# Patient Record
Sex: MALE | Race: WHITE | NOT HISPANIC OR LATINO | Employment: FULL TIME | URBAN - METROPOLITAN AREA
[De-identification: names, ages, dates, MRNs, and addresses within clinical notes are randomized per-mention and may not be internally consistent; named-entity substitution may affect disease eponyms.]

---

## 2018-02-13 ENCOUNTER — OFFICE VISIT - HEALTHEAST (OUTPATIENT)
Dept: FAMILY MEDICINE | Facility: CLINIC | Age: 38
End: 2018-02-13

## 2018-02-13 DIAGNOSIS — J02.9 SORE THROAT: ICD-10-CM

## 2018-02-13 LAB — DEPRECATED S PYO AG THROAT QL EIA: NORMAL

## 2018-02-13 ASSESSMENT — MIFFLIN-ST. JEOR: SCORE: 1681.04

## 2018-02-14 LAB — GROUP A STREP BY PCR: NORMAL

## 2018-02-16 ENCOUNTER — OFFICE VISIT - HEALTHEAST (OUTPATIENT)
Dept: FAMILY MEDICINE | Facility: CLINIC | Age: 38
End: 2018-02-16

## 2018-02-16 DIAGNOSIS — R05.9 COUGH: ICD-10-CM

## 2018-02-16 DIAGNOSIS — J02.9 SORE THROAT: ICD-10-CM

## 2018-02-16 DIAGNOSIS — R59.1 LYMPHADENOPATHY: ICD-10-CM

## 2018-02-16 LAB
DEPRECATED S PYO AG THROAT QL EIA: NORMAL
FLUAV AG SPEC QL IA: NORMAL
FLUBV AG SPEC QL IA: NORMAL
MONOCYTES NFR BLD AUTO: NEGATIVE %

## 2018-02-16 ASSESSMENT — MIFFLIN-ST. JEOR: SCORE: 1681.04

## 2018-02-17 LAB — GROUP A STREP BY PCR: NORMAL

## 2018-08-13 ENCOUNTER — COMMUNICATION - HEALTHEAST (OUTPATIENT)
Dept: TELEHEALTH | Facility: CLINIC | Age: 38
End: 2018-08-13

## 2018-08-13 ENCOUNTER — OFFICE VISIT - HEALTHEAST (OUTPATIENT)
Dept: FAMILY MEDICINE | Facility: CLINIC | Age: 38
End: 2018-08-13

## 2018-08-13 DIAGNOSIS — K64.9 HEMORRHOIDS, UNSPECIFIED HEMORRHOID TYPE: ICD-10-CM

## 2018-08-13 DIAGNOSIS — K62.5 RECTAL BLEEDING: ICD-10-CM

## 2018-08-13 DIAGNOSIS — R10.32 INGUINAL PAIN OF BOTH SIDES: ICD-10-CM

## 2018-08-13 DIAGNOSIS — R50.9 FEVER, UNSPECIFIED FEVER CAUSE: ICD-10-CM

## 2018-08-13 DIAGNOSIS — R35.89 POLYURIA: ICD-10-CM

## 2018-08-13 DIAGNOSIS — R10.31 INGUINAL PAIN OF BOTH SIDES: ICD-10-CM

## 2018-08-13 DIAGNOSIS — R10.32 LLQ ABDOMINAL PAIN: ICD-10-CM

## 2018-08-13 LAB
ALBUMIN SERPL-MCNC: 4.1 G/DL (ref 3.5–5)
ALBUMIN UR-MCNC: NEGATIVE MG/DL
ALP SERPL-CCNC: 50 U/L (ref 45–120)
ALT SERPL W P-5'-P-CCNC: 18 U/L (ref 0–45)
ANION GAP SERPL CALCULATED.3IONS-SCNC: 12 MMOL/L (ref 5–18)
APPEARANCE UR: ABNORMAL
AST SERPL W P-5'-P-CCNC: 14 U/L (ref 0–40)
BASOPHILS # BLD AUTO: 0 THOU/UL (ref 0–0.2)
BASOPHILS NFR BLD AUTO: 1 % (ref 0–2)
BILIRUB SERPL-MCNC: 0.5 MG/DL (ref 0–1)
BILIRUB UR QL STRIP: NEGATIVE
BUN SERPL-MCNC: 13 MG/DL (ref 8–22)
CALCIUM SERPL-MCNC: 9.3 MG/DL (ref 8.5–10.5)
CHLORIDE BLD-SCNC: 105 MMOL/L (ref 98–107)
CO2 SERPL-SCNC: 24 MMOL/L (ref 22–31)
COLOR UR AUTO: YELLOW
CREAT SERPL-MCNC: 1.08 MG/DL (ref 0.7–1.3)
EOSINOPHIL # BLD AUTO: 0.3 THOU/UL (ref 0–0.4)
EOSINOPHIL NFR BLD AUTO: 5 % (ref 0–6)
ERYTHROCYTE [DISTWIDTH] IN BLOOD BY AUTOMATED COUNT: 12.2 % (ref 11–14.5)
GFR SERPL CREATININE-BSD FRML MDRD: >60 ML/MIN/1.73M2
GLUCOSE BLD-MCNC: 98 MG/DL (ref 70–125)
GLUCOSE UR STRIP-MCNC: NEGATIVE MG/DL
HCT VFR BLD AUTO: 42.4 % (ref 40–54)
HGB BLD-MCNC: 14.9 G/DL (ref 14–18)
HGB UR QL STRIP: NEGATIVE
KETONES UR STRIP-MCNC: NEGATIVE MG/DL
LEUKOCYTE ESTERASE UR QL STRIP: NEGATIVE
LYMPHOCYTES # BLD AUTO: 1.9 THOU/UL (ref 0.8–4.4)
LYMPHOCYTES NFR BLD AUTO: 34 % (ref 20–40)
MCH RBC QN AUTO: 30.4 PG (ref 27–34)
MCHC RBC AUTO-ENTMCNC: 35 G/DL (ref 32–36)
MCV RBC AUTO: 87 FL (ref 80–100)
MONOCYTES # BLD AUTO: 0.4 THOU/UL (ref 0–0.9)
MONOCYTES NFR BLD AUTO: 8 % (ref 2–10)
NEUTROPHILS # BLD AUTO: 3 THOU/UL (ref 2–7.7)
NEUTROPHILS NFR BLD AUTO: 53 % (ref 50–70)
NITRATE UR QL: NEGATIVE
PH UR STRIP: 7.5 [PH] (ref 5–8)
PLATELET # BLD AUTO: 226 THOU/UL (ref 140–440)
PMV BLD AUTO: 8.8 FL (ref 7–10)
POTASSIUM BLD-SCNC: 4.3 MMOL/L (ref 3.5–5)
PROT SERPL-MCNC: 7.1 G/DL (ref 6–8)
RBC # BLD AUTO: 4.89 MILL/UL (ref 4.4–6.2)
SODIUM SERPL-SCNC: 141 MMOL/L (ref 136–145)
SP GR UR STRIP: 1.02 (ref 1–1.03)
UROBILINOGEN UR STRIP-ACNC: ABNORMAL
WBC: 5.5 THOU/UL (ref 4–11)

## 2018-08-13 ASSESSMENT — MIFFLIN-ST. JEOR: SCORE: 1693.73

## 2018-08-14 ENCOUNTER — COMMUNICATION - HEALTHEAST (OUTPATIENT)
Dept: FAMILY MEDICINE | Facility: CLINIC | Age: 38
End: 2018-08-14

## 2018-08-15 ENCOUNTER — HOSPITAL ENCOUNTER (OUTPATIENT)
Dept: ULTRASOUND IMAGING | Facility: HOSPITAL | Age: 38
Discharge: HOME | End: 2018-08-15
Attending: NURSE PRACTITIONER

## 2018-08-15 DIAGNOSIS — R10.30 LOWER ABDOMINAL PAIN, UNSPECIFIED: ICD-10-CM

## 2018-08-15 DIAGNOSIS — R10.31 INGUINAL PAIN OF BOTH SIDES: ICD-10-CM

## 2018-08-15 DIAGNOSIS — R10.32 INGUINAL PAIN OF BOTH SIDES: ICD-10-CM

## 2018-08-17 ENCOUNTER — COMMUNICATION - HEALTHEAST (OUTPATIENT)
Dept: FAMILY MEDICINE | Facility: CLINIC | Age: 38
End: 2018-08-17

## 2018-09-28 ENCOUNTER — RECORDS - HEALTHEAST (OUTPATIENT)
Dept: ADMINISTRATIVE | Facility: OTHER | Age: 38
End: 2018-09-28

## 2018-10-12 ENCOUNTER — RECORDS - HEALTHEAST (OUTPATIENT)
Dept: ADMINISTRATIVE | Facility: OTHER | Age: 38
End: 2018-10-12

## 2018-11-12 ENCOUNTER — RECORDS - HEALTHEAST (OUTPATIENT)
Dept: ADMINISTRATIVE | Facility: OTHER | Age: 38
End: 2018-11-12

## 2019-01-03 ENCOUNTER — OFFICE VISIT - HEALTHEAST (OUTPATIENT)
Dept: FAMILY MEDICINE | Facility: CLINIC | Age: 39
End: 2019-01-03

## 2019-01-03 DIAGNOSIS — K14.1 GEOGRAPHIC TONGUE: ICD-10-CM

## 2019-01-03 DIAGNOSIS — J01.90 ACUTE SINUSITIS, RECURRENCE NOT SPECIFIED, UNSPECIFIED LOCATION: ICD-10-CM

## 2019-01-03 DIAGNOSIS — J02.9 SORE THROAT: ICD-10-CM

## 2019-01-03 LAB — DEPRECATED S PYO AG THROAT QL EIA: NORMAL

## 2019-01-04 LAB — GROUP A STREP BY PCR: NORMAL

## 2019-08-02 ENCOUNTER — OFFICE VISIT - HEALTHEAST (OUTPATIENT)
Dept: FAMILY MEDICINE | Facility: CLINIC | Age: 39
End: 2019-08-02

## 2019-08-02 ENCOUNTER — RECORDS - HEALTHEAST (OUTPATIENT)
Dept: ADMINISTRATIVE | Facility: OTHER | Age: 39
End: 2019-08-02

## 2019-08-02 DIAGNOSIS — L03.115 CELLULITIS OF RIGHT LOWER EXTREMITY: ICD-10-CM

## 2019-08-02 LAB — WBC: 8.6 THOU/UL (ref 4–11)

## 2019-08-02 ASSESSMENT — MIFFLIN-ST. JEOR: SCORE: 1691

## 2019-08-08 ENCOUNTER — COMMUNICATION - HEALTHEAST (OUTPATIENT)
Dept: CARE COORDINATION | Facility: CLINIC | Age: 39
End: 2019-08-08

## 2019-08-15 ENCOUNTER — OFFICE VISIT - HEALTHEAST (OUTPATIENT)
Dept: FAMILY MEDICINE | Facility: CLINIC | Age: 39
End: 2019-08-15

## 2019-08-15 DIAGNOSIS — L03.115 CELLULITIS OF RIGHT LOWER EXTREMITY: ICD-10-CM

## 2019-08-15 DIAGNOSIS — L02.415 ABSCESS OF RIGHT LOWER EXTREMITY: ICD-10-CM

## 2019-08-15 ASSESSMENT — MIFFLIN-ST. JEOR: SCORE: 1683.29

## 2019-08-23 ENCOUNTER — COMMUNICATION - HEALTHEAST (OUTPATIENT)
Dept: LAB | Facility: CLINIC | Age: 39
End: 2019-08-23

## 2019-08-23 ENCOUNTER — AMBULATORY - HEALTHEAST (OUTPATIENT)
Dept: FAMILY MEDICINE | Facility: CLINIC | Age: 39
End: 2019-08-23

## 2019-08-23 ENCOUNTER — AMBULATORY - HEALTHEAST (OUTPATIENT)
Dept: LAB | Facility: CLINIC | Age: 39
End: 2019-08-23

## 2019-08-23 DIAGNOSIS — L03.115 CELLULITIS OF RIGHT LOWER EXTREMITY: ICD-10-CM

## 2019-08-28 LAB
AEROBIC BLOOD CULTURE BOTTLE: NO GROWTH
ANAEROBIC BLOOD CULTURE BOTTLE: NO GROWTH

## 2019-10-21 ENCOUNTER — OFFICE VISIT - HEALTHEAST (OUTPATIENT)
Dept: FAMILY MEDICINE | Facility: CLINIC | Age: 39
End: 2019-10-21

## 2019-10-21 DIAGNOSIS — H69.93 DYSFUNCTION OF BOTH EUSTACHIAN TUBES: ICD-10-CM

## 2019-10-21 DIAGNOSIS — J01.90 ACUTE SINUSITIS WITH SYMPTOMS > 10 DAYS: ICD-10-CM

## 2019-10-21 DIAGNOSIS — H93.13 TINNITUS, BILATERAL: ICD-10-CM

## 2019-12-02 ENCOUNTER — RECORDS - HEALTHEAST (OUTPATIENT)
Dept: ADMINISTRATIVE | Facility: OTHER | Age: 39
End: 2019-12-02

## 2019-12-12 ENCOUNTER — RECORDS - HEALTHEAST (OUTPATIENT)
Dept: ADMINISTRATIVE | Facility: OTHER | Age: 39
End: 2019-12-12

## 2019-12-17 ENCOUNTER — OFFICE VISIT - HEALTHEAST (OUTPATIENT)
Dept: FAMILY MEDICINE | Facility: CLINIC | Age: 39
End: 2019-12-17

## 2019-12-17 DIAGNOSIS — J30.9 ALLERGIC RHINITIS, UNSPECIFIED SEASONALITY, UNSPECIFIED TRIGGER: ICD-10-CM

## 2019-12-17 DIAGNOSIS — Z01.818 ENCOUNTER FOR PREOPERATIVE EXAMINATION FOR GENERAL SURGICAL PROCEDURE: ICD-10-CM

## 2019-12-17 DIAGNOSIS — J34.2 DEVIATED NASAL SEPTUM: ICD-10-CM

## 2019-12-17 LAB — HGB BLD-MCNC: 15.4 G/DL (ref 14–18)

## 2019-12-17 ASSESSMENT — MIFFLIN-ST. JEOR: SCORE: 1688.28

## 2019-12-26 ENCOUNTER — RECORDS - HEALTHEAST (OUTPATIENT)
Dept: ADMINISTRATIVE | Facility: OTHER | Age: 39
End: 2019-12-26

## 2020-01-02 ENCOUNTER — RECORDS - HEALTHEAST (OUTPATIENT)
Dept: ADMINISTRATIVE | Facility: OTHER | Age: 40
End: 2020-01-02

## 2020-01-14 ENCOUNTER — RECORDS - HEALTHEAST (OUTPATIENT)
Dept: ADMINISTRATIVE | Facility: OTHER | Age: 40
End: 2020-01-14

## 2020-01-30 ENCOUNTER — RECORDS - HEALTHEAST (OUTPATIENT)
Dept: ADMINISTRATIVE | Facility: OTHER | Age: 40
End: 2020-01-30

## 2020-02-11 ENCOUNTER — RECORDS - HEALTHEAST (OUTPATIENT)
Dept: ADMINISTRATIVE | Facility: OTHER | Age: 40
End: 2020-02-11

## 2020-10-27 ENCOUNTER — OFFICE VISIT - HEALTHEAST (OUTPATIENT)
Dept: FAMILY MEDICINE | Facility: CLINIC | Age: 40
End: 2020-10-27

## 2020-10-27 DIAGNOSIS — M54.2 NECK PAIN: ICD-10-CM

## 2020-10-27 DIAGNOSIS — M54.50 LUMBAR PAIN: ICD-10-CM

## 2020-10-27 DIAGNOSIS — Z13.220 LIPID SCREENING: ICD-10-CM

## 2020-10-27 DIAGNOSIS — Z13.1 DIABETES MELLITUS SCREENING: ICD-10-CM

## 2020-10-27 DIAGNOSIS — Z00.00 ROUTINE GENERAL MEDICAL EXAMINATION AT A HEALTH CARE FACILITY: ICD-10-CM

## 2020-10-27 DIAGNOSIS — J30.9 ALLERGIC RHINITIS, UNSPECIFIED SEASONALITY, UNSPECIFIED TRIGGER: ICD-10-CM

## 2020-10-27 LAB
CHOLEST SERPL-MCNC: 252 MG/DL
FASTING STATUS PATIENT QL REPORTED: YES
FASTING STATUS PATIENT QL REPORTED: YES
GLUCOSE BLD-MCNC: 86 MG/DL (ref 70–125)
HDLC SERPL-MCNC: 59 MG/DL
LDLC SERPL CALC-MCNC: 169 MG/DL
TRIGL SERPL-MCNC: 121 MG/DL

## 2020-10-27 ASSESSMENT — MIFFLIN-ST. JEOR: SCORE: 1682.18

## 2020-12-02 ENCOUNTER — COMMUNICATION - HEALTHEAST (OUTPATIENT)
Dept: FAMILY MEDICINE | Facility: CLINIC | Age: 40
End: 2020-12-02

## 2020-12-08 ENCOUNTER — AMBULATORY - HEALTHEAST (OUTPATIENT)
Dept: FAMILY MEDICINE | Facility: CLINIC | Age: 40
End: 2020-12-08

## 2020-12-08 DIAGNOSIS — M54.2 NECK PAIN: ICD-10-CM

## 2020-12-09 ENCOUNTER — COMMUNICATION - HEALTHEAST (OUTPATIENT)
Dept: PHYSICAL MEDICINE AND REHAB | Facility: CLINIC | Age: 40
End: 2020-12-09

## 2020-12-14 ENCOUNTER — HOSPITAL ENCOUNTER (OUTPATIENT)
Dept: PHYSICAL MEDICINE AND REHAB | Facility: CLINIC | Age: 40
Discharge: HOME | End: 2020-12-14
Attending: NURSE PRACTITIONER

## 2020-12-14 DIAGNOSIS — M54.50 LUMBAR SPINE PAIN: ICD-10-CM
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
## 2020-12-14 DIAGNOSIS — F40.240 CLAUSTROPHOBIA: ICD-10-CM

## 2020-12-14 DIAGNOSIS — M54.2 CERVICALGIA: ICD-10-CM

## 2020-12-14 ASSESSMENT — MIFFLIN-ST. JEOR: SCORE: 1675.58

## 2020-12-21 ENCOUNTER — HOSPITAL ENCOUNTER (OUTPATIENT)
Dept: MRI IMAGING | Facility: CLINIC | Age: 40
Discharge: HOME | End: 2020-12-21
Attending: PHYSICIAN ASSISTANT

## 2020-12-21 ENCOUNTER — COMMUNICATION - HEALTHEAST (OUTPATIENT)
Dept: PHYSICAL MEDICINE AND REHAB | Facility: CLINIC | Age: 40
End: 2020-12-21
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
## 2020-12-21 DIAGNOSIS — M54.50 LUMBAR SPINE PAIN: ICD-10-CM

## 2020-12-21 DIAGNOSIS — M54.2 CERVICALGIA: ICD-10-CM

## 2020-12-22 ENCOUNTER — OFFICE VISIT - HEALTHEAST (OUTPATIENT)
Dept: PHYSICAL THERAPY | Facility: CLINIC | Age: 40
End: 2020-12-22
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
## 2020-12-22 DIAGNOSIS — R19.8 ABDOMINAL WEAKNESS: ICD-10-CM

## 2020-12-22 DIAGNOSIS — M62.81 WEAKNESS OF TRUNK MUSCULATURE: ICD-10-CM

## 2020-12-22 DIAGNOSIS — M54.50 LUMBAR SPINE PAIN: ICD-10-CM

## 2020-12-22 DIAGNOSIS — M54.2 CERVICAL PAIN: ICD-10-CM

## 2020-12-29 ENCOUNTER — OFFICE VISIT - HEALTHEAST (OUTPATIENT)
Dept: PHYSICAL THERAPY | Facility: CLINIC | Age: 40
End: 2020-12-29

## 2020-12-29 DIAGNOSIS — M54.2 CERVICAL PAIN: ICD-10-CM

## 2020-12-29 DIAGNOSIS — M62.81 WEAKNESS OF TRUNK MUSCULATURE: ICD-10-CM

## 2020-12-29 DIAGNOSIS — R19.8 ABDOMINAL WEAKNESS: ICD-10-CM
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
## 2020-12-29 DIAGNOSIS — M54.50 LUMBAR SPINE PAIN: ICD-10-CM

## 2021-01-05 ENCOUNTER — OFFICE VISIT - HEALTHEAST (OUTPATIENT)
Dept: PHYSICAL THERAPY | Facility: CLINIC | Age: 41
End: 2021-01-05

## 2021-01-05 DIAGNOSIS — R19.8 ABDOMINAL WEAKNESS: ICD-10-CM
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
## 2021-01-05 DIAGNOSIS — M54.50 LUMBAR SPINE PAIN: ICD-10-CM

## 2021-01-05 DIAGNOSIS — M54.2 CERVICAL PAIN: ICD-10-CM

## 2021-01-05 DIAGNOSIS — M62.81 WEAKNESS OF TRUNK MUSCULATURE: ICD-10-CM

## 2021-01-08 ENCOUNTER — OFFICE VISIT - HEALTHEAST (OUTPATIENT)
Dept: PHYSICAL THERAPY | Facility: CLINIC | Age: 41
End: 2021-01-08

## 2021-01-08 DIAGNOSIS — M62.81 WEAKNESS OF TRUNK MUSCULATURE: ICD-10-CM
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
## 2021-01-08 DIAGNOSIS — M54.50 LUMBAR SPINE PAIN: ICD-10-CM

## 2021-01-08 DIAGNOSIS — M54.2 CERVICAL PAIN: ICD-10-CM

## 2021-01-08 DIAGNOSIS — R19.8 ABDOMINAL WEAKNESS: ICD-10-CM

## 2021-01-12 ENCOUNTER — OFFICE VISIT - HEALTHEAST (OUTPATIENT)
Dept: PHYSICAL THERAPY | Facility: CLINIC | Age: 41
End: 2021-01-12

## 2021-01-12 DIAGNOSIS — M54.2 CERVICAL PAIN: ICD-10-CM

## 2021-01-12 DIAGNOSIS — R19.8 ABDOMINAL WEAKNESS: ICD-10-CM

## 2021-01-12 DIAGNOSIS — M54.50 LUMBAR SPINE PAIN: ICD-10-CM

## 2021-01-12 DIAGNOSIS — M62.81 WEAKNESS OF TRUNK MUSCULATURE: ICD-10-CM
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
## 2021-01-15 ENCOUNTER — OFFICE VISIT - HEALTHEAST (OUTPATIENT)
Dept: PHYSICAL THERAPY | Facility: CLINIC | Age: 41
End: 2021-01-15

## 2021-01-15 DIAGNOSIS — R19.8 ABDOMINAL WEAKNESS: ICD-10-CM

## 2021-01-15 DIAGNOSIS — M62.81 WEAKNESS OF TRUNK MUSCULATURE: ICD-10-CM
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
## 2021-01-15 DIAGNOSIS — M54.50 LUMBAR SPINE PAIN: ICD-10-CM

## 2021-01-15 DIAGNOSIS — M54.2 CERVICAL PAIN: ICD-10-CM

## 2021-01-18 ENCOUNTER — HOSPITAL ENCOUNTER (OUTPATIENT)
Dept: PHYSICAL MEDICINE AND REHAB | Facility: CLINIC | Age: 41
Discharge: HOME | End: 2021-01-18
Attending: PHYSICIAN ASSISTANT

## 2021-01-18 DIAGNOSIS — M50.20 CERVICAL DISC HERNIATION: ICD-10-CM
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
## 2021-01-18 DIAGNOSIS — M54.2 CERVICALGIA: ICD-10-CM

## 2021-01-18 DIAGNOSIS — M51.26 LUMBAR DISC HERNIATION: ICD-10-CM

## 2021-01-18 DIAGNOSIS — M54.50 LUMBAR SPINE PAIN: ICD-10-CM

## 2021-01-19 ENCOUNTER — OFFICE VISIT - HEALTHEAST (OUTPATIENT)
Dept: PHYSICAL THERAPY | Facility: CLINIC | Age: 41
End: 2021-01-19
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
## 2021-01-19 DIAGNOSIS — M54.50 LUMBAR SPINE PAIN: ICD-10-CM

## 2021-01-19 DIAGNOSIS — M62.81 WEAKNESS OF TRUNK MUSCULATURE: ICD-10-CM

## 2021-01-19 DIAGNOSIS — M54.2 CERVICAL PAIN: ICD-10-CM

## 2021-01-19 DIAGNOSIS — R19.8 ABDOMINAL WEAKNESS: ICD-10-CM

## 2021-01-22 ENCOUNTER — OFFICE VISIT - HEALTHEAST (OUTPATIENT)
Dept: PHYSICAL THERAPY | Facility: CLINIC | Age: 41
End: 2021-01-22

## 2021-01-22 DIAGNOSIS — M62.81 WEAKNESS OF TRUNK MUSCULATURE: ICD-10-CM

## 2021-01-22 DIAGNOSIS — M54.2 CERVICAL PAIN: ICD-10-CM

## 2021-01-22 DIAGNOSIS — M54.50 LUMBAR SPINE PAIN: ICD-10-CM

## 2021-01-22 DIAGNOSIS — R19.8 ABDOMINAL WEAKNESS: ICD-10-CM
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
## 2021-01-26 ENCOUNTER — OFFICE VISIT - HEALTHEAST (OUTPATIENT)
Dept: PHYSICAL THERAPY | Facility: CLINIC | Age: 41
End: 2021-01-26

## 2021-01-26 DIAGNOSIS — M54.2 CERVICAL PAIN: ICD-10-CM
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
## 2021-01-26 DIAGNOSIS — M62.81 WEAKNESS OF TRUNK MUSCULATURE: ICD-10-CM

## 2021-01-26 DIAGNOSIS — R19.8 ABDOMINAL WEAKNESS: ICD-10-CM

## 2021-01-26 DIAGNOSIS — M54.50 LUMBAR SPINE PAIN: ICD-10-CM

## 2021-02-02 ENCOUNTER — OFFICE VISIT - HEALTHEAST (OUTPATIENT)
Dept: PHYSICAL THERAPY | Facility: CLINIC | Age: 41
End: 2021-02-02

## 2021-02-02 DIAGNOSIS — R19.8 ABDOMINAL WEAKNESS: ICD-10-CM

## 2021-02-02 DIAGNOSIS — M54.2 CERVICAL PAIN: ICD-10-CM

## 2021-02-02 DIAGNOSIS — M62.81 WEAKNESS OF TRUNK MUSCULATURE: ICD-10-CM

## 2021-02-02 DIAGNOSIS — M54.50 LUMBAR SPINE PAIN: ICD-10-CM
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
## 2021-02-04 ENCOUNTER — OFFICE VISIT - HEALTHEAST (OUTPATIENT)
Dept: FAMILY MEDICINE | Facility: CLINIC | Age: 41
End: 2021-02-04

## 2021-02-04 DIAGNOSIS — R09.81 SINUS CONGESTION: ICD-10-CM

## 2021-02-04 DIAGNOSIS — Z20.822 SUSPECTED COVID-19 VIRUS INFECTION: ICD-10-CM

## 2021-02-04 DIAGNOSIS — R51.9 FRONTAL HEADACHE: ICD-10-CM

## 2021-02-06 ENCOUNTER — AMBULATORY - HEALTHEAST (OUTPATIENT)
Dept: FAMILY MEDICINE | Facility: CLINIC | Age: 41
End: 2021-02-06

## 2021-02-06 DIAGNOSIS — R09.81 SINUS CONGESTION: ICD-10-CM

## 2021-02-06 DIAGNOSIS — R51.9 FRONTAL HEADACHE: ICD-10-CM

## 2021-02-06 DIAGNOSIS — Z20.822 SUSPECTED COVID-19 VIRUS INFECTION: ICD-10-CM

## 2021-02-07 ENCOUNTER — COMMUNICATION - HEALTHEAST (OUTPATIENT)
Dept: SCHEDULING | Facility: CLINIC | Age: 41
End: 2021-02-07

## 2021-02-07 LAB
SARS-COV-2 PCR COMMENT: NORMAL
SARS-COV-2 RNA SPEC QL NAA+PROBE: NEGATIVE
SARS-COV-2 VIRUS SPECIMEN SOURCE: NORMAL

## 2021-02-08 ENCOUNTER — HOSPITAL ENCOUNTER (OUTPATIENT)
Dept: PHYSICAL MEDICINE AND REHAB | Facility: CLINIC | Age: 41
Discharge: HOME | End: 2021-02-08
Attending: PHYSICIAN ASSISTANT

## 2021-02-08 DIAGNOSIS — M50.20 CERVICAL DISC HERNIATION: ICD-10-CM

## 2021-02-09 ENCOUNTER — OFFICE VISIT - HEALTHEAST (OUTPATIENT)
Dept: PHYSICAL THERAPY | Facility: CLINIC | Age: 41
End: 2021-02-09

## 2021-02-09 DIAGNOSIS — R19.8 ABDOMINAL WEAKNESS: ICD-10-CM

## 2021-02-09 DIAGNOSIS — M62.81 WEAKNESS OF TRUNK MUSCULATURE: ICD-10-CM

## 2021-02-09 DIAGNOSIS — M54.50 LUMBAR SPINE PAIN: ICD-10-CM

## 2021-02-09 DIAGNOSIS — M54.2 CERVICAL PAIN: ICD-10-CM
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
## 2021-02-16 ENCOUNTER — OFFICE VISIT - HEALTHEAST (OUTPATIENT)
Dept: PHYSICAL THERAPY | Facility: CLINIC | Age: 41
End: 2021-02-16

## 2021-02-16 DIAGNOSIS — M62.81 WEAKNESS OF TRUNK MUSCULATURE: ICD-10-CM

## 2021-02-16 DIAGNOSIS — M54.2 CERVICAL PAIN: ICD-10-CM
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
## 2021-02-16 DIAGNOSIS — R19.8 ABDOMINAL WEAKNESS: ICD-10-CM

## 2021-02-16 DIAGNOSIS — M54.50 LUMBAR SPINE PAIN: ICD-10-CM

## 2021-02-22 ENCOUNTER — HOSPITAL ENCOUNTER (OUTPATIENT)
Dept: PHYSICAL MEDICINE AND REHAB | Facility: CLINIC | Age: 41
Discharge: HOME | End: 2021-02-22
Attending: PHYSICIAN ASSISTANT
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
## 2021-02-22 DIAGNOSIS — M54.50 LUMBAR SPINE PAIN: ICD-10-CM

## 2021-02-22 DIAGNOSIS — M54.2 CERVICALGIA: ICD-10-CM

## 2021-02-23 ENCOUNTER — OFFICE VISIT - HEALTHEAST (OUTPATIENT)
Dept: PHYSICAL THERAPY | Facility: CLINIC | Age: 41
End: 2021-02-23

## 2021-02-23 DIAGNOSIS — R19.8 ABDOMINAL WEAKNESS: ICD-10-CM
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
## 2021-02-23 DIAGNOSIS — M62.81 WEAKNESS OF TRUNK MUSCULATURE: ICD-10-CM

## 2021-02-23 DIAGNOSIS — M54.2 CERVICAL PAIN: ICD-10-CM

## 2021-02-23 DIAGNOSIS — M54.50 LUMBAR SPINE PAIN: ICD-10-CM

## 2021-03-05 ENCOUNTER — OFFICE VISIT - HEALTHEAST (OUTPATIENT)
Dept: PHYSICAL THERAPY | Facility: CLINIC | Age: 41
End: 2021-03-05

## 2021-03-05 DIAGNOSIS — M54.2 CERVICAL PAIN: ICD-10-CM

## 2021-03-05 DIAGNOSIS — M54.50 LUMBAR SPINE PAIN: ICD-10-CM
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
## 2021-03-05 DIAGNOSIS — M62.81 WEAKNESS OF TRUNK MUSCULATURE: ICD-10-CM

## 2021-03-05 DIAGNOSIS — R19.8 ABDOMINAL WEAKNESS: ICD-10-CM

## 2021-03-09 ENCOUNTER — OFFICE VISIT - HEALTHEAST (OUTPATIENT)
Dept: PHYSICAL THERAPY | Facility: CLINIC | Age: 41
End: 2021-03-09

## 2021-03-09 DIAGNOSIS — M54.50 LUMBAR SPINE PAIN: ICD-10-CM

## 2021-03-09 DIAGNOSIS — R19.8 ABDOMINAL WEAKNESS: ICD-10-CM

## 2021-03-09 DIAGNOSIS — M62.81 WEAKNESS OF TRUNK MUSCULATURE: ICD-10-CM
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
## 2021-03-09 DIAGNOSIS — M54.2 CERVICAL PAIN: ICD-10-CM

## 2021-03-16 ENCOUNTER — OFFICE VISIT - HEALTHEAST (OUTPATIENT)
Dept: PHYSICAL THERAPY | Facility: CLINIC | Age: 41
End: 2021-03-16

## 2021-03-16 ENCOUNTER — HOSPITAL ENCOUNTER (OUTPATIENT)
Dept: PHYSICAL MEDICINE AND REHAB | Facility: CLINIC | Age: 41
Discharge: HOME | End: 2021-03-16
Attending: PHYSICIAN ASSISTANT
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
## 2021-03-16 DIAGNOSIS — M54.2 CERVICAL PAIN: ICD-10-CM

## 2021-03-16 DIAGNOSIS — M54.2 CERVICALGIA: ICD-10-CM

## 2021-03-16 DIAGNOSIS — M54.50 LUMBAR SPINE PAIN: ICD-10-CM

## 2021-03-16 DIAGNOSIS — R19.8 ABDOMINAL WEAKNESS: ICD-10-CM

## 2021-03-16 DIAGNOSIS — M62.81 WEAKNESS OF TRUNK MUSCULATURE: ICD-10-CM

## 2021-03-16 ASSESSMENT — MIFFLIN-ST. JEOR: SCORE: 1674.22

## 2021-03-22 ENCOUNTER — HOSPITAL ENCOUNTER (OUTPATIENT)
Dept: PHYSICAL MEDICINE AND REHAB | Facility: CLINIC | Age: 41
Discharge: HOME | End: 2021-03-22
Attending: PHYSICIAN ASSISTANT

## 2021-03-22 DIAGNOSIS — M54.2 CERVICALGIA: ICD-10-CM

## 2021-03-23 ENCOUNTER — COMMUNICATION - HEALTHEAST (OUTPATIENT)
Dept: PHYSICAL MEDICINE AND REHAB | Facility: CLINIC | Age: 41
End: 2021-03-23

## 2021-03-23 ENCOUNTER — COMMUNICATION - HEALTHEAST (OUTPATIENT)
Dept: PHYSICAL THERAPY | Facility: CLINIC | Age: 41
End: 2021-03-23

## 2021-03-23 DIAGNOSIS — M47.812 CERVICAL SPONDYLOSIS WITHOUT MYELOPATHY: ICD-10-CM

## 2021-03-30 ENCOUNTER — OFFICE VISIT - HEALTHEAST (OUTPATIENT)
Dept: PHYSICAL THERAPY | Facility: CLINIC | Age: 41
End: 2021-03-30

## 2021-03-30 DIAGNOSIS — R19.8 ABDOMINAL WEAKNESS: ICD-10-CM
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
## 2021-03-30 DIAGNOSIS — M54.2 CERVICAL PAIN: ICD-10-CM

## 2021-03-30 DIAGNOSIS — M54.50 LUMBAR SPINE PAIN: ICD-10-CM

## 2021-03-30 DIAGNOSIS — M62.81 WEAKNESS OF TRUNK MUSCULATURE: ICD-10-CM

## 2021-04-06 ENCOUNTER — HOSPITAL ENCOUNTER (OUTPATIENT)
Dept: PHYSICAL MEDICINE AND REHAB | Facility: CLINIC | Age: 41
Discharge: HOME | End: 2021-04-06
Attending: PHYSICIAN ASSISTANT

## 2021-04-06 DIAGNOSIS — M47.812 CERVICAL SPONDYLOSIS WITHOUT MYELOPATHY: ICD-10-CM

## 2021-04-12 ENCOUNTER — HOSPITAL ENCOUNTER (OUTPATIENT)
Dept: PHYSICAL MEDICINE AND REHAB | Facility: CLINIC | Age: 41
Discharge: HOME | End: 2021-04-12
Attending: PHYSICIAN ASSISTANT

## 2021-04-12 DIAGNOSIS — M47.812 CERVICAL SPONDYLOSIS WITHOUT MYELOPATHY: ICD-10-CM

## 2021-04-26 ENCOUNTER — HOSPITAL ENCOUNTER (OUTPATIENT)
Dept: PHYSICAL MEDICINE AND REHAB | Facility: CLINIC | Age: 41
Discharge: HOME | End: 2021-04-26
Attending: PHYSICIAN ASSISTANT
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
## 2021-04-26 DIAGNOSIS — M47.812 ARTHROPATHY OF CERVICAL FACET JOINT: ICD-10-CM

## 2021-04-26 DIAGNOSIS — M51.26 LUMBAR DISC HERNIATION: ICD-10-CM

## 2021-04-26 DIAGNOSIS — M54.50 LUMBAR SPINE PAIN: ICD-10-CM

## 2021-04-26 ASSESSMENT — MIFFLIN-ST. JEOR: SCORE: 1674.22

## 2021-05-07 ENCOUNTER — HOSPITAL ENCOUNTER (OUTPATIENT)
Dept: PHYSICAL MEDICINE AND REHAB | Facility: CLINIC | Age: 41
Discharge: HOME | End: 2021-05-07
Attending: PHYSICIAN ASSISTANT

## 2021-05-07 DIAGNOSIS — M47.812 ARTHROPATHY OF CERVICAL FACET JOINT: ICD-10-CM

## 2021-05-31 VITALS — WEIGHT: 166.4 LBS | BODY MASS INDEX: 23.82 KG/M2 | HEIGHT: 70 IN

## 2021-05-31 VITALS — HEIGHT: 70 IN | BODY MASS INDEX: 23.82 KG/M2 | WEIGHT: 166.4 LBS

## 2021-05-31 NOTE — PROGRESS NOTES
"Assessment and Plan:     1. Cellulitis of right lower extremity  White blood count is 8.6.  Will treat with Bactrim DS.  Educated on its indications and side effects.  Discussed applying warm compresses.  There is no ideal area to drain this.  I do not feel I would be able to express any drainage.  I did draw a line around the area.  I educated the patient that if the erythema extends past the line this weekend, suggest urgent care or ER evaluation.  He is content with the plan. I spent 25 minutes with the patient with greater than 50% spent discussing symptoms, treatment options, and coordination of care.     - White Blood Count (WBC)  - sulfamethoxazole-trimethoprim (BACTRIM DS) 800-160 mg per tablet; Take 1 tablet by mouth 2 (two) times a day for 10 days.  Dispense: 20 tablet; Refill: 0      Subjective:     Vinny is a 39 y.o. male presenting to the clinic for concerns for swelling behind his right knee.  Patient developed a pimple-like lesion behind his right knee on Tuesday.  He did try to pop the area and expressed some purulent drainage.  He states the swelling worsened last night.  He has been applying warm compresses.  He has been taking ibuprofen.  If he does not take ibuprofen, he feels as though he limps.  No fever has been present.  He has been exercising by performing \"Insanity\".  He states he has been sweating excessively from the workout.  He is unsure if it has contributed to the swelling.    Review of Systems: A complete 14 point review of systems was obtained and is negative or as stated in the history of present illness.    Social History     Socioeconomic History     Marital status: Single     Spouse name: Not on file     Number of children: Not on file     Years of education: Not on file     Highest education level: Not on file   Occupational History     Not on file   Social Needs     Financial resource strain: Not on file     Food insecurity:     Worry: Not on file     Inability: Not on file " "    Transportation needs:     Medical: Not on file     Non-medical: Not on file   Tobacco Use     Smoking status: Passive Smoke Exposure - Never Smoker     Smokeless tobacco: Never Used   Substance and Sexual Activity     Alcohol use: Yes     Alcohol/week: 5.0 oz     Types: 10 drink(s) per week     Drug use: No     Sexual activity: Not on file   Lifestyle     Physical activity:     Days per week: Not on file     Minutes per session: Not on file     Stress: Not on file   Relationships     Social connections:     Talks on phone: Not on file     Gets together: Not on file     Attends Yazidi service: Not on file     Active member of club or organization: Not on file     Attends meetings of clubs or organizations: Not on file     Relationship status: Not on file     Intimate partner violence:     Fear of current or ex partner: Not on file     Emotionally abused: Not on file     Physically abused: Not on file     Forced sexual activity: Not on file   Other Topics Concern     Not on file   Social History Narrative     Not on file       Active Ambulatory Problems     Diagnosis Date Noted     Joint Pain, Localized In The Elbow      Closed Posterior Dislocation Of The Left Elbow      Deviated Nasal Septum (Acquired)      Allergic Rhinitis      Bright red blood per rectum 12/07/2014     External hemorrhoids without mention of complication 12/07/2014     Preop examination 12/09/2014     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    Objective:     /70   Pulse 69   Ht 5' 10\" (1.778 m)   Wt 169 lb 11.2 oz (77 kg)   SpO2 96%   BMI 24.35 kg/m      Patient is alert, in no obvious distress.   Skin: Warm, dry.    Lungs:  Clear to auscultation. Respirations even and unlabored.  No wheezing or " rales noted.   Heart:  Regular rate and rhythm.  No murmurs.   Musculoskeletal:  He has full ROM of his right knee.  He has a 2 inch x 1 inch area of erythema behind his right knee. He has mild induration present.  It is warm to touch.

## 2021-05-31 NOTE — PROGRESS NOTES
"TCM DISCHARGE FOLLOW UP CALL    Discharge Date:  8/7/2019  Reason for hospital stay (discharge diagnosis)::  Cellulitis  Are you feeling better, the same or worse since your discharge?:  Patient is feeling better (Leg is still sore but able to walk on it.  No new redness, wife helping w/dressing changes, will change the dressing again later today.)  Do you feel like you have a plan in the event of a health emergency?: Yes (Wife, Janet)    As part of your discharge plan, were  home care services ordered for you?: No    Did you receive any new medications, or was there a change to your medications?: Yes    Are you taking those medications, or do you have any established regiment?:  RN reviewed discharge medications w/pt.  Pt states he is takjng Bactrim DS twice a day, and \"2 Tylenol, not sure the strength,\" for pain PRN, hasn't taken it today.  He did not  the oxycodone rx.  He's stopped cephalexin & ibuprofen, as instructed.   Do you have any follow up visits scheduled with your PCP or Specialist?:  Yes, with PCP  (RN) Is PCP appt scheduled soon enough (within 14 days of discharge date)?: Yes (Li Poe CNP 8/15/19)        Jenna Rg RN Care Manager, Population Health    "

## 2021-05-31 NOTE — TELEPHONE ENCOUNTER
Patient was told by Li Poe CNP to return in one week for follow up blood cultures.  Patient is on the lab schedule this afternoon.  Please place orders ASAP  Thank you!

## 2021-05-31 NOTE — PROGRESS NOTES
Assessment and Plan:     1. Cellulitis of right lower extremity     2. Abscess of right lower extremity       Patient will continue his Bactrim as prescribed.  He will follow-up in 1 week for repeat blood culture and sensitivity as recommended per ID.  If fever develops or symptoms worsen, he will follow-up sooner.  Discussed findings on the MRI of his knee.  He does not want to follow-up with orthopedics at this time.  He is content with the plan.    Post Discharge Medication Reconciliation Status: discharge medications reconciled, continue medications without change      Subjective:     Vinny is a 39 y.o. male presenting to the clinic for follow-up on hospitalization.  Patient was seen initially in clinic on 8/2/2019 with concerns of cellulitis within his right posterior knee.  His white blood count was 8.6.  He was treated with Bactrim DS.  The erythema pain worsened by evening, so he presented to the emergency room where an I&D was performed and cephalexin was added to the Bactrim.  Patient presented to the emergency room on 8/3/2019 as symptoms continued to worsen.   He had a palpable area of fluctuance in the right knee popliteal fossa with surrounding cellulitis.  Ultrasound was performed demonstrating a fluid collection approximately 1 cm underneath the skin.  I&D was performed.  He was admitted for IV vancomycin.  Cultures and sensitivity grew out MRSA.  Patient's wound was left open and care providers were packing with gauze once daily.  He was discharged with 1 week of Bactrim DS.  Patient presents today stating his symptoms have improved.  He has minimal pain.  He ambulates without difficulty.  No fever has been present.  He removed the packing last night and did not replace it.  He denies drainage from the wound.    MRI of the knee on 8/5/19 showed the following:     CONCLUSION:  1.  Postop changes posterior right knee in keeping with recent I&D.  2.  Posterior right knee superficial cellulitis with  mild superficial fibers medial head gastrocnemius myositis. No evidence for soft tissue abscess.  3.  Tiny knee joint effusion with synovitis.  4.  No evidence for osteomyelitis.  5.  Free edge radial tearing anterior horn, body and posterior horn lateral meniscus. Posteroinferiorly displaced posterior horn lateral meniscus fragment along the posterior margin of the posterior horn lateral meniscus.  6.  High-grade 13 x 4 mm chondral defect posterior weightbearing lateral femoral condyle. Small grade 2/3 chondral defect posterior lateral tibial plateau.  7.  Intact right knee medial meniscus, cruciate and collateral ligaments.    Review of Systems: A complete 14 point review of systems was obtained and is negative or as stated in the history of present illness.    Social History     Socioeconomic History     Marital status: Single     Spouse name: Not on file     Number of children: Not on file     Years of education: Not on file     Highest education level: Not on file   Occupational History     Not on file   Social Needs     Financial resource strain: Not on file     Food insecurity:     Worry: Not on file     Inability: Not on file     Transportation needs:     Medical: Not on file     Non-medical: Not on file   Tobacco Use     Smoking status: Never Smoker     Smokeless tobacco: Never Used   Substance and Sexual Activity     Alcohol use: Yes     Alcohol/week: 9.0 oz     Types: 10 Standard drinks or equivalent, 5 Cans of beer per week     Drug use: No     Sexual activity: Not on file   Lifestyle     Physical activity:     Days per week: Not on file     Minutes per session: Not on file     Stress: Not on file   Relationships     Social connections:     Talks on phone: Not on file     Gets together: Not on file     Attends Bahai service: Not on file     Active member of club or organization: Not on file     Attends meetings of clubs or organizations: Not on file     Relationship status: Not on file     Intimate  "partner violence:     Fear of current or ex partner: Not on file     Emotionally abused: Not on file     Physically abused: Not on file     Forced sexual activity: Not on file   Other Topics Concern     Not on file   Social History Narrative     Not on file       Active Ambulatory Problems     Diagnosis Date Noted     Joint Pain, Localized In The Elbow      Closed Posterior Dislocation Of The Left Elbow      Deviated Nasal Septum (Acquired)      Allergic Rhinitis      Bright red blood per rectum 12/07/2014     External hemorrhoids without mention of complication 12/07/2014     Preop examination 12/09/2014     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    Objective:     /66   Pulse 78   Ht 5' 10\" (1.778 m)   Wt 168 lb (76.2 kg)   SpO2 96%   BMI 24.11 kg/m      Patient is alert, in no obvious distress.   Skin: Warm, dry.    Lungs:  Clear to auscultation. Respirations even and unlabored.  No wheezing or rales noted.   Heart:  Regular rate and rhythm.  No murmurs, S3, S4, gallops, or rubs.    Musculoskeletal: He has full ROM of his right knee.  He has a 1/2 inch healing incision that is not actively draining.  No induration or fluctuance is present.  No erythema is present.               "

## 2021-06-01 VITALS — BODY MASS INDEX: 24.38 KG/M2 | HEIGHT: 70 IN | WEIGHT: 170.3 LBS

## 2021-06-02 VITALS — BODY MASS INDEX: 24.15 KG/M2 | WEIGHT: 168.31 LBS

## 2021-06-02 NOTE — PROGRESS NOTES
Assessment/Plan:     1. Acute sinusitis with symptoms > 10 days  methylPREDNISolone (MEDROL DOSEPACK) 4 mg tablet    amoxicillin-clavulanate (AUGMENTIN) 875-125 mg per tablet   2. Tinnitus, bilateral     3. Dysfunction of both eustachian tubes         Diagnoses and all orders for this visit:    Acute sinusitis with symptoms > 10 days  -     methylPREDNISolone (MEDROL DOSEPACK) 4 mg tablet; Follow package directions  Dispense: 21 tablet; Refill: 0  -     amoxicillin-clavulanate (AUGMENTIN) 875-125 mg per tablet; Take 1 tablet by mouth 2 (two) times a day for 10 days.  Dispense: 20 tablet; Refill: 0      Tinnitus, bilateral    Dysfunction of both eustachian tubes    He has history of chronic postnasal drainage for the past several months and now with symptoms of tinnitus and eustachian tube dysfunction.  Given duration of sinus symptoms, I recommend treatment with Augmentin twice daily for 10 days.  Counseled on use of medication and side effects.  We will also treat with Medrol Dosepak.  Counseled on use of this medication side effects.  If symptoms not improved, recommend follow-up with ear, nose and throat specialist.  He is comfortable with this plan         Subjective:      Vinny Hernandez is a 39 y.o. male who comes in today with concern about bilateral tinnitus.  Started this morning.  States that tinnitus is a little bit improved currently and is not as severe as it was this morning.  Symptoms were so severe that he felt quite nauseated.  He also had difficulty hearing.  He was having a hard time concentrating.  He is now starting to have a little bit of pain in the right ear.  States that tinnitus is worse with high-pitched sounds.  States that he has sounded like he has had a cold for several months but otherwise feels fine.  He has not had any significant nasal congestion or rhinorrhea.  No sinus pain or pressure.  No fevers or chills.  No cough or cold symptoms.  No allergy symptoms.  Review of systems  is otherwise negative.  Medications and allergies are reviewed and updated.  No other questions today.    Current Outpatient Medications   Medication Sig Dispense Refill     amoxicillin-clavulanate (AUGMENTIN) 875-125 mg per tablet Take 1 tablet by mouth 2 (two) times a day for 10 days. 20 tablet 0     methylPREDNISolone (MEDROL DOSEPACK) 4 mg tablet Follow package directions 21 tablet 0     No current facility-administered medications for this visit.        Past Medical History, Family History, and Social History reviewed.  History reviewed. No pertinent past medical history.  Past Surgical History:   Procedure Laterality Date     HAND SURGERY       HERNIA REPAIR       knee orthoscopic (right)       KNEE SURGERY       LARYNGOSCOPY N/A 12/12/2014    Procedure: DIRECT LARYNGOSCOPY AND BIOPSY LEFT VOCAL CORD;  Surgeon: Ernesto Galvez MD;  Location: [facility] OR;  Service:      TONSILLECTOMY       TONSILLECTOMY       No known drug allergies  History reviewed. No pertinent family history.  Social History     Socioeconomic History     Marital status: Single     Spouse name: Not on file     Number of children: Not on file     Years of education: Not on file     Highest education level: Not on file   Occupational History     Not on file   Social Needs     Financial resource strain: Not on file     Food insecurity:     Worry: Not on file     Inability: Not on file     Transportation needs:     Medical: Not on file     Non-medical: Not on file   Tobacco Use     Smoking status: Never Smoker     Smokeless tobacco: Never Used   Substance and Sexual Activity     Alcohol use: Yes     Alcohol/week: 15.0 standard drinks     Types: 10 Standard drinks or equivalent, 5 Cans of beer per week     Drug use: No     Sexual activity: Not on file   Lifestyle     Physical activity:     Days per week: Not on file     Minutes per session: Not on file     Stress: Not on file   Relationships     Social connections:     Talks on phone:  Not on file     Gets together: Not on file     Attends Nondenominational service: Not on file     Active member of club or organization: Not on file     Attends meetings of clubs or organizations: Not on file     Relationship status: Not on file     Intimate partner violence:     Fear of current or ex partner: Not on file     Emotionally abused: Not on file     Physically abused: Not on file     Forced sexual activity: Not on file   Other Topics Concern     Not on file   Social History Narrative     Not on file         Review of systems is as stated in HPI, and the remainder of the 10 system review is otherwise negative.    Objective:     Vitals:    10/21/19 1404   BP: 118/78   Patient Site: Right Arm   Patient Position: Sitting   Cuff Size: Adult Regular   Pulse: 73   Temp: 97.9  F (36.6  C)   TempSrc: Oral   SpO2: 96%   Weight: 169 lb 2 oz (76.7 kg)    Body mass index is 24.27 kg/m .      General appearance: alert, appears stated age and cooperative  Head: Normocephalic, without obvious abnormality, atraumatic  Eyes: conjunctivae/corneas clear. PERRL, EOM's intact. Fundi benign.  Ears: abnormal TM right ear - dull and purulent middle ear fluid and abnormal TM left ear - dull and purulent middle ear fluid  Nose: mucoid discharge, turbinates red  Throat: lips, mucosa, and tongue normal; teeth and gums normal  Neck: no adenopathy  Lungs: clear to auscultation bilaterally  Heart: regular rate and rhythm, S1, S2 normal, no murmur, click, rub or gallop      This note has been dictated using voice recognition software. Any grammatical or context distortions are unintentional and inherent to the the software.

## 2021-06-03 VITALS — BODY MASS INDEX: 24.05 KG/M2 | HEIGHT: 70 IN | WEIGHT: 168 LBS

## 2021-06-03 VITALS
HEART RATE: 83 BPM | DIASTOLIC BLOOD PRESSURE: 72 MMHG | BODY MASS INDEX: 24.21 KG/M2 | SYSTOLIC BLOOD PRESSURE: 108 MMHG | HEIGHT: 70 IN | WEIGHT: 169.1 LBS | OXYGEN SATURATION: 98 %

## 2021-06-03 VITALS — WEIGHT: 169.7 LBS | HEIGHT: 70 IN | BODY MASS INDEX: 24.29 KG/M2

## 2021-06-03 VITALS
WEIGHT: 169.13 LBS | BODY MASS INDEX: 24.27 KG/M2 | SYSTOLIC BLOOD PRESSURE: 118 MMHG | DIASTOLIC BLOOD PRESSURE: 78 MMHG | OXYGEN SATURATION: 96 % | HEART RATE: 73 BPM | TEMPERATURE: 97.9 F

## 2021-06-04 VITALS
SYSTOLIC BLOOD PRESSURE: 118 MMHG | WEIGHT: 165 LBS | BODY MASS INDEX: 23.1 KG/M2 | OXYGEN SATURATION: 96 % | DIASTOLIC BLOOD PRESSURE: 78 MMHG | HEIGHT: 71 IN | HEART RATE: 72 BPM

## 2021-06-04 NOTE — PATIENT INSTRUCTIONS - HE
Before Your Surgery       Call your surgeon if there is any change in your health. This includes signs of a cold or flu (such as a sore throat, runny nose, cough, rash or fever).       Do not smoke, drink alcohol or take over the counter medicine (unless your surgeon or primary care doctor tells you to) for the 24 hours before and after surgery.       If you take prescribed drugs: Follow your doctor s orders about which medicines to take and which to stop until after surgery.      Eating and drinking prior to surgery: follow the instructions from your surgeon.      Take a shower or bath the night before surgery. Use the soap your surgeon gave you to gently clean your skin. If you do not have soap from your surgeon, use your regular soap. Do not shave or scrub the surgery site. Wear clean pajamas and have clean sheets on your bed.             Hold all supplements, aspirin and NSAIDs for 7 days prior to surgery.    Follow your surgeon's direction on when to stop eating and drinking prior to surgery.    Your surgeon will be managing your pain after your surgery.      Remove nail polish from fingers before surgery.

## 2021-06-04 NOTE — PROGRESS NOTES
Preoperative Exam    Scheduled Procedure: Deviated septum  Surgery Date:  12/20/19  Surgery Location: [facility]    Surgeon:      Assessment/Plan:     1. Encounter for preoperative examination for general surgical procedure  Patient will hold NSAIDs for 7 days prior to surgery.  - Hemoglobin  - Tdap vaccine greater than or equal to 6yo IM    2. Deviated nasal septum    3. Allergic rhinitis, unspecified seasonality, unspecified trigger  He continues Flonase.      Surgical Procedure Risk: Low (reported cardiac risk generally < 1%)  Have you had prior anesthesia?: Yes  Have you or any family members had a previous anesthesia reaction:  No  Do you or any family members have a history of a clotting or bleeding disorder?: No  Cardiac Risk Assessment: no increased risk for major cardiac complications    APPROVAL GIVEN to proceed with proposed procedure, without further diagnostic evaluation    Functional Status: Independent  Patient plans to recover at home with family.     Subjective:      Vinny Hernandez is a 39 y.o. male who presents for a preoperative consultation.  Patient has had chronic sinus congestion for multiple years.  He complains of postnasal drainage.  He occasionally snores.  He has difficulty breathing through his nose.  He has a deviated nasal septum.  He denies any recent cold symptoms or fever.  No sore throat has been present.  He has been using over-the-counter Flonase.  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    All other systems reviewed and are negative, other than those listed in the HPI.    Pertinent History  Do you have difficulty breathing or chest pain after walking up a flight of stairs: No  History of obstructive sleep apnea: No  Steroid use in the last 6 months: No  Frequent Aspirin/NSAID use: No  Prior Blood Transfusion: No  Prior Blood Transfusion Reaction: No  If for some reason prior to, during or after the procedure, if it is medically indicated,  would you be willing to have a blood transfusion?:  There is no transfusion refusal.    Current Outpatient Medications   Medication Sig Dispense Refill     fluticasone propionate (FLONASE) 50 mcg/actuation nasal spray        No current facility-administered medications for this visit.         Allergies   Allergen Reactions     No Known Drug Allergies        Patient Active Problem List   Diagnosis     Deviated Nasal Septum (Acquired)     Allergic Rhinitis     External hemorrhoids without mention of complication     Vocal cord mass     Deviated septum     Vocal cord papilloma virus       No past medical history on file.    Past Surgical History:   Procedure Laterality Date     HAND SURGERY       HERNIA REPAIR       knee orthoscopic (right)       KNEE SURGERY       LARYNGOSCOPY N/A 12/12/2014    Procedure: DIRECT LARYNGOSCOPY AND BIOPSY LEFT VOCAL CORD;  Surgeon: Ernesto Galvez MD;  Location: [facility];  Service:      TONSILLECTOMY       TONSILLECTOMY         Social History     Socioeconomic History     Marital status: Single     Spouse name: Not on file     Number of children: Not on file     Years of education: Not on file     Highest education level: Not on file   Occupational History     Not on file   Social Needs     Financial resource strain: Not on file     Food insecurity:     Worry: Not on file     Inability: Not on file     Transportation needs:     Medical: Not on file     Non-medical: Not on file   Tobacco Use     Smoking status: Never Smoker     Smokeless tobacco: Never Used   Substance and Sexual Activity     Alcohol use: Yes     Alcohol/week: 15.0 standard drinks     Types: 10 Standard drinks or equivalent, 5 Cans of beer per week     Drug use: No     Sexual activity: Not on file   Lifestyle     Physical activity:     Days per week: Not on file     Minutes per session: Not on file     Stress: Not on file   Relationships     Social connections:     Talks on phone: Not on file     Gets  "together: Not on file     Attends Episcopal service: Not on file     Active member of club or organization: Not on file     Attends meetings of clubs or organizations: Not on file     Relationship status: Not on file     Intimate partner violence:     Fear of current or ex partner: Not on file     Emotionally abused: Not on file     Physically abused: Not on file     Forced sexual activity: Not on file   Other Topics Concern     Not on file   Social History Narrative     Not on file       Patient Care Team:  Li Poe CNP as PCP - General (Family Medicine)  Li Poe CNP as Assigned PCP  Jihan Villanueva MD as Assigned PCP          Objective:     Vitals:    12/17/19 1452   BP: 108/72   Pulse: 83   SpO2: 98%   Weight: 169 lb 1.6 oz (76.7 kg)   Height: 5' 10\" (1.778 m)         Physical Exam:  Physical Exam   /72   Pulse 83   Ht 5' 10\" (1.778 m)   Wt 169 lb 1.6 oz (76.7 kg)   SpO2 98%   BMI 24.26 kg/m      General Appearance:    Alert, cooperative, no distress, appears stated age   Head:    Normocephalic, without obvious abnormality, atraumatic   Eyes:    PERRL, conjunctiva/corneas clear, EOM's intact, fundi     benign, both eyes        Ears:    Normal TM's and external ear canals, both ears   Nose:   Nares normal, deviated nasal septum is present, mucosa normal, no drainage    or sinus tenderness   Throat:   Lips, mucosa, and tongue normal; teeth and gums normal   Neck:   Supple, symmetrical, trachea midline, no adenopathy;        thyroid:  No enlargement/tenderness/nodules; no carotid    bruit or JVD   Back:     Symmetric, no curvature, ROM normal, no CVA tenderness   Lungs:     Clear to auscultation bilaterally, respirations unlabored   Chest wall:    No tenderness or deformity   Heart:    Regular rate and rhythm, S1 and S2 normal, no murmur, rub    or gallop   Abdomen:     Soft, non-tender, bowel sounds active all four quadrants,     no masses, no organomegaly   Genitalia:    deferred   Rectal: "    deferred   Extremities:   Extremities normal, atraumatic, no cyanosis or edema   Pulses:   2+ and symmetric all extremities   Skin:   Skin color, texture, turgor normal, no rashes or lesions   Lymph nodes:   Cervical, supraclavicular, and axillary nodes normal   Neurologic:   CNII-XII intact. Normal strength, sensation and reflexes       throughout       Patient Instructions      Before Your Surgery       Call your surgeon if there is any change in your health. This includes signs of a cold or flu (such as a sore throat, runny nose, cough, rash or fever).       Do not smoke, drink alcohol or take over the counter medicine (unless your surgeon or primary care doctor tells you to) for the 24 hours before and after surgery.       If you take prescribed drugs: Follow your doctor s orders about which medicines to take and which to stop until after surgery.      Eating and drinking prior to surgery: follow the instructions from your surgeon.      Take a shower or bath the night before surgery. Use the soap your surgeon gave you to gently clean your skin. If you do not have soap from your surgeon, use your regular soap. Do not shave or scrub the surgery site. Wear clean pajamas and have clean sheets on your bed.             Hold all supplements, aspirin and NSAIDs for 7 days prior to surgery.    Follow your surgeon's direction on when to stop eating and drinking prior to surgery.    Your surgeon will be managing your pain after your surgery.      Remove nail polish from fingers before surgery.        Labs:  Recent Results (from the past 24 hour(s))   Hemoglobin    Collection Time: 12/17/19  3:25 PM   Result Value Ref Range    Hemoglobin 15.4 14.0 - 18.0 g/dL       Immunization History   Administered Date(s) Administered     DTaP, historic 1982, 02/10/1983, 08/11/1983, 08/09/1984, 08/13/1987     Dtap 1982, 02/10/1983, 08/11/1983, 08/09/1984, 08/13/1987     IPV 1982, 02/10/1983, 08/09/1984, 08/13/1987      MMR 10/13/1983, 03/29/1994     Td, Adult, Absorbed 02/12/1996     Td,adult,historic,unspecified 02/12/1996     Tdap 12/17/2019           Electronically signed by Li Poe CNP 12/17/19 2:54 PM

## 2021-06-05 VITALS — BODY MASS INDEX: 23.77 KG/M2 | WEIGHT: 166 LBS | HEIGHT: 70 IN

## 2021-06-05 VITALS — BODY MASS INDEX: 23.81 KG/M2 | WEIGHT: 166.3 LBS | HEIGHT: 70 IN

## 2021-06-05 VITALS — WEIGHT: 166 LBS | BODY MASS INDEX: 23.77 KG/M2 | HEIGHT: 70 IN
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
## 2021-06-13 NOTE — PATIENT INSTRUCTIONS - HE
[facility] Scheduling    Please call [phone] to schedule your image(s) (select option #1). There are 3 different locations, see below. You can do walk-in visits for xray only images if you want.     [facility]  [address] [place] [id number]    [facility]   [address] [address]    [place] [id number] [facility] [id number]    An order for physical therapy has been provided today.  Someone will call you to schedule physical therapy or you can call [phone] to schedule physical therapy.  It will be very important for you to do your physical therapy exercises on a regular basis to decrease your pain and prevent future flares of pain.
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
## 2021-06-13 NOTE — TELEPHONE ENCOUNTER
Phone call to patient to review results and provider's recommendations. Results given and explained. Discussed moving forward with the ordered PT (scheduled for tomorrow) and follow up with PSP in 4 weeks. Stated understanding and appreciation for call. Transferred to scheduling to make appointment.  *Explained the follow up would be virtual visit unless he has new or worsening symptoms.      Patient did inquire if he can/should continue to see chiropractor or just do PT at this point.
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
## 2021-06-13 NOTE — TELEPHONE ENCOUNTER
Phone call to patient to inform [facility] would recommend PT only at this time. Stated understanding and appreciation for call back.

## 2021-06-13 NOTE — TELEPHONE ENCOUNTER
Referral Request  Type of referral: Spine   Who s requesting: Patient  Why the request: Patient states he still have neck pain steroids and muscle relaxer's did not give improvement .  Have you been seen for this request: N/A  Does patient have a preference on a group/provider? [facility]   Okay to leave a detailed message?  No
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
## 2021-06-14 NOTE — PATIENT INSTRUCTIONS - HE
A cervical epidural steroid injection has been ordered today.      Please note that this injection uses cortisone.  The cortisone may somewhat weaken the immune system.  It is unknown how much the immune system is weakened.  It is unknown if it is weakened to the point that you may be more likely to get the COVID-19 virus, or if you do get the COVID-19 virus, if you would be sicker than you would have been if you had not had the cortisone injection.  If you do not wish to proceed with the injection, please let the nurse/physician know and do NOT schedule the injection.    Please note that since your immune system is weakened from the cortisone, having a flu vaccine/shot may be less effective if you have this vaccine within 2 weeks from your cortisone injection.  It is advised to wait 2 weeks after your cortisone injection to have the flu shot (or if you have the flu shot first, wait 2 weeks before you have the cortisone injection).    Please schedule this injection at least 1 week  from now to allow time for insurance prior authorization.  On the day of your injection, you cannot be sick or taking antibiotics.  If you become sick and are prescribed, please call the clinic so your injection can be rescheduled for once you have completed your antibiotics.  You will need to bring a  with you for your injection.   If you have any questions or concerns prior to your injection, please do not hesitate to call the nurse navigation line at [phone] or contact Marybeth Granados through [facility].
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
## 2021-06-15 NOTE — PROGRESS NOTES
Vinny Hernandez is a 40 y.o. male who is being evaluated via a billable video visit.      How would you like to obtain your AVS? MyChart.  If dropped from the video visit, the video invitation should be resent by: Text to cell phone: [phone]  Will anyone else be joining your video visit? No      Video Start Time: 2:00 PM    1. Sinus congestion  Symptomatic COVID-19 Virus (CORONAVIRUS) PCR   2. Frontal headache  Symptomatic COVID-19 Virus (CORONAVIRUS) PCR   3. Suspected COVID-19 virus infection  Symptomatic COVID-19 Virus (CORONAVIRUS) PCR     Differentials include COVID-19, other viral illnesses, allergies.  Will obtain Covid testing due to possible exposure at a .  Discussed symptomatic treatment with over-the-counter nasal saline sprays, antihistamine, decongestant.  He can also consider taking over-the-counter acetaminophen or ibuprofen as needed for pain.  He is to increase his fluid intake.  If symptoms persist or worsen, may consider treatment for sinusitis.  He is content with the plan.    Subjective     Vinny Hernandez is 40 y.o. and presents for a video visit today for the following health issues   HPI   Patient states last night he developed a frontal headache.  He has also been experiencing sinus congestion and bilateral ear pain.  He denies postnasal drainage, stomachache, nausea, vomiting, fever, loss of sense of smell and taste.  Patient states he did eat peppers last night which did cause a cough.  He has not coughed since.  He denies shortness of breath, chest tightness, wheezing.  Patient states a co-worker is ill, but tested negative for Covid.  He had a Covid test on Friday prior to going home for .  He has performed a nasal saline rinse which expelled purulent drainage with some blood.  He has a history of a deviated nasal septum requiring surgery.    Review of Systems  As noted above, otherwise negative.      Objective       Vitals:  No vitals were obtained today due to  virtual visit.    Physical Exam   Constitutional: He appears well-developed and well-nourished. No distress.   HENT:   Head: Normocephalic and atraumatic.   Right Ear: External ear normal.   Nose: Nose normal.   Eyes: Conjunctivae are normal.   Neck: Normal range of motion.   Pulmonary/Chest: Effort normal. No respiratory distress.   Musculoskeletal: Normal range of motion.   Neurological: He is alert.   Skin: No erythema.   Psychiatric: He has a normal mood and affect. His behavior is normal. Judgment and thought content normal.   He points to his frontal sinuses as the area of his headache.        Video-Visit Details    Type of service:  Video Visit    Video End Time (time video stopped): 2:05 PM  Originating Location (pt. Location): Home    Distant Location (provider location):  [facility]     Platform used for Video Visit: [place]

## 2021-06-15 NOTE — PATIENT INSTRUCTIONS - HE
DISCHARGE INSTRUCTIONS    During office hours (8:00 a.m.- 4:00 p.m.) questions or concerns may be answered  by calling [facility] Center Navigation Nurses at  [phone].  Messages received after hours will be returned the following business day.      In the case of an emergency, please dial 911 or seek assistance at the nearest Emergency Room/Urgent Care facility.     All Patients:    ? You may experience an increase in your symptoms for the first 2 days (It may take anywhere between 2 days- 2 weeks for the steroid to have maximum effect).    ? You may use ice on the injection site, as frequently as 20 minutes each hour if needed.    ? You may take your pain medicine.    ? You may continue taking your regular medication after your injection. If you have had a Medial Branch Block you may resume pain medication once your pain diary is completed.    ? You may shower. No swimming, tub bath or hot tub for 48 hours.  You may remove your bandaid/bandage as soon as you are home.    ? You may resume light activities, as tolerated.    ? Resume your usual diet as tolerated.    ? It is strongly advised that you do not drive for 1-3 hours post injection.    ? If you have had oral sedation:  Do not drive for 8 hours post injection.      ? If you have had IV sedation:  Do not drive for 24 hours post injection.  Do not operate hazardous machinery or make important personal/business decisions for 24 hours.      POSSIBLE STEROID SIDE EFFECTS (If steroid/cortisone was used for your procedure)    -If you experience these symptoms, it should only last for a short period      Swelling of the legs                Skin redness (flushing)       Mouth (oral) irritation     Blood sugar (glucose) levels              Sweats                      Mood changes    Headache    Sleeplessness    Weakened immune system for up to 14 days, which could increase the risk of marquis the COVID-19 virus and/or experiencing more severe symptoms of the  disease, if exposed.    Decreased effectiveness of the flu vaccine if given within 2 weeks of the steroid.         POSSIBLE PROCEDURE SIDE EFFECTS  -Call the Spine Center if you are concerned    Increased Pain             Increased numbness/tingling        Nausea/Vomiting            Bruising/bleeding at site        Redness or swelling                                                Difficulty walking        Weakness             Fever greater than 100.5    *In the event of a severe headache after an epidural steroid injection that is relieved by lying down, please call the [facility] to speak with a clinical staff member*
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
## 2021-06-16 NOTE — PATIENT INSTRUCTIONS - HE
Please follow up two weeks post procedure with Marybeth to evaluate your plan of care.       DISCHARGE INSTRUCTIONS    During office hours (8:00 a.m.- 4:00 p.m.) questions or concerns may be answered  by calling [facility] Navigation Nurses at  [phone].  Messages received after hours will be returned the following business day.      In the case of an emergency, please dial 911 or seek assistance at the nearest Emergency Room/Urgent Care facility.     All Patients:    ? You may experience an increase in your symptoms for the first 2 days (It may take anywhere between 2 days- 2 weeks for the steroid to have maximum effect).    ? You may use ice on the injection site, as frequently as 20 minutes each hour if needed.    ? You may take your pain medicine.    ? You may continue taking your regular medication after your injection. If you have had a Medial Branch Block you may resume pain medication once your pain diary is completed.    ? You may shower. No swimming, tub bath or hot tub for 48 hours.  You may remove your bandaid/bandage as soon as you are home.    ? You may resume light activities, as tolerated.    ? Resume your usual diet as tolerated.    ? It is strongly advised that you do not drive for 1-3 hours post injection.    ? If you have had oral sedation:  Do not drive for 8 hours post injection.      ? If you have had IV sedation:  Do not drive for 24 hours post injection.  Do not operate hazardous machinery or make important personal/business decisions for 24 hours.      POSSIBLE STEROID SIDE EFFECTS (If steroid/cortisone was used for your procedure)    -If you experience these symptoms, it should only last for a short period      Swelling of the legs                Skin redness (flushing)       Mouth (oral) irritation     Blood sugar (glucose) levels              Sweats                      Mood changes    Headache    Sleeplessness    Weakened immune system for up to 14 days, which could increase the risk  of marquis the COVID-19 virus and/or experiencing more severe symptoms of the disease, if exposed.    Decreased effectiveness of the flu vaccine if given within 2 weeks of the steroid.         POSSIBLE PROCEDURE SIDE EFFECTS  -Call the Spine Center if you are concerned    Increased Pain             Increased numbness/tingling        Nausea/Vomiting            Bruising/bleeding at site        Redness or swelling                                                Difficulty walking        Weakness             Fever greater than 100.5    *In the event of a severe headache after an epidural steroid injection that is relieved by lying down, please call the [facility] to speak with a clinical staff member*

## 2021-06-16 NOTE — PROGRESS NOTES
Assessment and Plan:     1. Lymphadenopathy     2. Sore throat  Rapid Strep A Screen-Throat    Mononucleosis Screen    Group A Strep, RNA Direct Detection, Throat    amoxicillin-clavulanate (AUGMENTIN) 875-125 mg per tablet    predniSONE (DELTASONE) 20 MG tablet   3. Cough  Influenza A/B Rapid Test     Differentials include hand, foot, and mouth and herpetic viral infection.  I am concerned for possible secondary infection due to the significant lymphadenopathy.  I discussed the case with Dr. Tubbs.  Will treat with Augmentin 875 mg twice daily for 10 days.  Educated on its indications and side effects.  Discussed symptomatic treatment.  Will treat with prednisone 20 mg twice daily due to significant lymphadenopathy which is causing some difficulty with swallowing.  Discussed concerning symptoms including fever, worsening sore throat, worsening swelling in the neck.  If these occur, suggest ER evaluation.  He is content with the plan.    Subjective:     Vinny is a 37 y.o. male presenting to the clinic for concerns for ongoing sore throat.  Patient was seen on 2/13/18 where he had been experiencing a sore throat for 4 days.  Strep test was negative.  He continues to experience a sore throat which she describes as a constant ache.  States his glands are significantly swollen.  He has had difficulty swallowing due to this.  Wednesday night, he developed sinus congestion and a nonproductive cough.  He complains of postnasal drainage.  He denies shortness of breath, chest tightness, wheezing, body aches, fever.  He has had an intermittent headache.  He has been taking ibuprofen as needed.  Multiple family members are ill.    Review of Systems: A complete 14 point review of systems was obtained and is negative or as stated in the history of present illness.    Social History     Social History     Marital status: Single     Spouse name: N/A     Number of children: N/A     Years of education: N/A     Occupational History  "    Not on file.     Social History Main Topics     Smoking status: Passive Smoke Exposure - Never Smoker     Smokeless tobacco: Never Used     Alcohol use 5.0 oz/week     10 drink(s) per week     Drug use: No     Sexual activity: Not on file     Other Topics Concern     Not on file     Social History Narrative       Active Ambulatory Problems     Diagnosis Date Noted     Joint Pain, Localized In The Elbow      Closed Posterior Dislocation Of The Left Elbow      Acute Pharyngitis      Deviated Nasal Septum (Acquired)      Allergic Rhinitis      Bright red blood per rectum 12/07/2014     External hemorrhoids without mention of complication 12/07/2014     Preop examination 12/09/2014     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    Objective:     /80  Pulse 88  Temp 98.5  F (36.9  C)  Ht 5' 10\" (1.778 m)  Wt 166 lb 6.4 oz (75.5 kg)  SpO2 98%  BMI 23.88 kg/m2    Patient is alert, in no obvious distress.   Skin: Warm, dry.  No lesions or rashes.  Skin turgor rapid return.   HEENT:  Head normocephalic, atraumatic.  Eyes normal. Ears normal.  Nose patent, mucosa red.  Oropharynx erythematous.  Tonsils +2.  Small 2-3 mm erythematous sores are noted on the upper palate and around the tonsils.   Neck: Supple, significant anterior cervical lymphadenopathy is present.   Lungs:  Clear to auscultation. Respirations even and unlabored.  No wheezing or rales noted.   Heart:  Regular rate and rhythm.  No murmurs.   Musculoskeletal:  No nuchal rigidity is present.    LABORATORY: Rapid strep, strep culture, mono, influenza ordered.  Rapid strep, mono, and influenza are negative.            " 36.4

## 2021-06-16 NOTE — PROGRESS NOTES
Assessment and Plan:     1. Sore throat  Rapid Strep A Screen-Throat    Group A Strep, RNA Direct Detection, Throat     Discussed symptomatic treatment of viral symptoms.  Patient may consider taking over-the-counter Tylenol or ibuprofen for discomfort.  He will consume more fluids.  If no improvement in symptoms, he is to follow-up.  He is content with the plan.    Subjective:     Vinny is a 37 y.o. male presenting to the clinic for concerns for sore throat for 4 days.  Patient states the sore throat is worsening.  He describes the pain as a constant ache.  He complains of sinus congestion, postnasal drainage.  He denies cough, headache, stomachache, nausea, vomiting, fever.  He has been taking over-the-counter Sudafed.  No one else in his family is ill.  He has multiple coworkers who are ill.     Review of Systems: A complete 14 point review of systems was obtained and is negative or as stated in the history of present illness.    Social History     Social History     Marital status: Single     Spouse name: N/A     Number of children: N/A     Years of education: N/A     Occupational History     Not on file.     Social History Main Topics     Smoking status: Passive Smoke Exposure - Never Smoker     Smokeless tobacco: Never Used     Alcohol use 5.0 oz/week     10 drink(s) per week     Drug use: No     Sexual activity: Not on file     Other Topics Concern     Not on file     Social History Narrative       Active Ambulatory Problems     Diagnosis Date Noted     Joint Pain, Localized In The Elbow      Closed Posterior Dislocation Of The Left Elbow      Acute Pharyngitis      Deviated Nasal Septum (Acquired)      Allergic Rhinitis      Bright red blood per rectum 12/07/2014     External hemorrhoids without mention of complication 12/07/2014     Preop examination 12/09/2014     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    Objective:     /68  Pulse 90  Ht 5' 10\" (1.778 m)  Wt 166 lb 6.4 oz (75.5 kg)  SpO2 96%  BMI 23.88 kg/m2    Patient is alert, in no obvious distress.   Skin: Warm, dry.  No lesions or rashes.  Skin turgor rapid return.   HEENT:  Head normocephalic, atraumatic.  Eyes normal. Ears normal.  Nose patent, mucosa red.  Oropharynx erythematous.  No lesions or tonsillar enlargement.   Neck: Supple, no lymphadenopathy.  Lungs:  Clear to auscultation. Respirations even and unlabored.  No wheezing or rales noted.   Heart:  Regular rate and rhythm.  No murmurs    LABORATORY: Rapid strep and strep culture ordered.  Rapid strep is negative.            "

## 2021-06-16 NOTE — PATIENT INSTRUCTIONS - HE
*Return Pain Diary as soon as possible. We will review & get back to you with future plan of care.      DISCHARGE INSTRUCTIONS    During office hours (8:00 a.m.- 4:00 p.m.) questions or concerns may be answered  by calling [facility] Navigation Nurses at  [phone].  Messages received after hours will be returned the following business day.      In the case of an emergency, please dial 911 or seek assistance at the nearest Emergency Room/Urgent Care facility.     All Patients:  ? You may experience an increase in your symptoms for the first 2 days, once the numbing medication wears off.    ? You may resume your regular medication, no pain medication until you have completed your diary    ? You may shower. No swimming, tub bath or hot tub for 24 hours; remove bandage after 4 hours    ? Continue your activities that can cause you pain to test the blocks.                         ? You should not drive for the next 3 to 5 hours (have someone drive you)           POSSIBLE PROCEDURE SIDE EFFECTS  -Call Spine Center if concerned-    Increased Pain  Increased numbness/tingling     Nausea/Vomiting  Bruising/bleeding at site (hematoma)             Swelling at site (edema) Headache  Difficulty walking  Infection        Fever greater than 100.5
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
## 2021-06-16 NOTE — TELEPHONE ENCOUNTER
Called patient to discuss pain diary that was received for a left C5, C6, C7, T1 MBB done by Dr Loco as ordered by Marybeth Granados PA-C. Message was left stating that a detailed [employer] message will be sent with recommendations to proceed with a left C5-6, C6-7, C7-T1 FJI. Injections requirements left in message and encouraged to call with questions or concerns. Scheduling number also provided.    Rosemary YANES RN [facility] 3/23/2021 9:52 AM

## 2021-06-16 NOTE — PATIENT INSTRUCTIONS - HE
Follow-up visit with SOURAV Castaneda in 2 weeks to discuss injection outcome and determine care plan going forward.       DISCHARGE INSTRUCTIONS    During office hours (8:00 a.m.- 4:00 p.m.) questions or concerns may be answered  by calling [facility] Center Navigation Nurses at  [phone].  Messages received after hours will be returned the following business day.      In the case of an emergency, please dial 911 or seek assistance at the nearest Emergency Room/Urgent Care facility.     All Patients:    ? You may experience an increase in your symptoms for the first 2 days (It may take anywhere between 2 days- 2 weeks for the steroid to have maximum effect).    ? You may use ice on the injection site, as frequently as 20 minutes each hour if needed.    ? You may take your pain medicine.    ? You may continue taking your regular medication after your injection. If you have had a Medial Branch Block you may resume pain medication once your pain diary is completed.    ? You may shower. No swimming, tub bath or hot tub for 48 hours.  You may remove your bandaid/bandage as soon as you are home.    ? You may resume light activities, as tolerated.    ? Resume your usual diet as tolerated.    ? It is strongly advised that you do not drive for 1-3 hours post injection.    ? If you have had oral sedation:  Do not drive for 8 hours post injection.      ? If you have had IV sedation:  Do not drive for 24 hours post injection.  Do not operate hazardous machinery or make important personal/business decisions for 24 hours.      POSSIBLE STEROID SIDE EFFECTS (If steroid/cortisone was used for your procedure)    -If you experience these symptoms, it should only last for a short period      Swelling of the legs                Skin redness (flushing)       Mouth (oral) irritation     Blood sugar (glucose) levels              Sweats                      Mood changes    Headache    Sleeplessness    Weakened immune system for up to 14  days, which could increase the risk of marquis the COVID-19 virus and/or experiencing more severe symptoms of the disease, if exposed.    Decreased effectiveness of the flu vaccine if given within 2 weeks of the steroid.         POSSIBLE PROCEDURE SIDE EFFECTS  -Call the Spine Center if you are concerned    Increased Pain             Increased numbness/tingling        Nausea/Vomiting            Bruising/bleeding at site        Redness or swelling                                                Difficulty walking        Weakness             Fever greater than 100.5    *In the event of a severe headache after an epidural steroid injection that is relieved by lying down, please call the [facility] to speak with a clinical staff member*

## 2021-06-17 NOTE — PATIENT INSTRUCTIONS - HE
Follow-up visit with SOURAV Castaneda in 2 weeks to discuss injection outcome and determine care plan going forward.       DISCHARGE INSTRUCTIONS    During office hours (8:00 a.m.- 4:00 p.m.) questions or concerns may be answered  by calling [facility] Navigation Nurses at  [phone].  Messages received after hours will be returned the following business day.      In the case of an emergency, please dial 911 or seek assistance at the nearest Emergency Room/Urgent Care facility.     All Patients:    ? You may experience an increase in your symptoms for the first 2 days (It may take anywhere between 2 days- 2 weeks for the steroid to have maximum effect).    ? You may use ice on the injection site, as frequently as 20 minutes each hour if needed.    ? You may take your pain medicine.    ? You may continue taking your regular medication after your injection. If you have had a Medial Branch Block you may resume pain medication once your pain diary is completed.    ? You may shower. No swimming, tub bath or hot tub for 48 hours.  You may remove your bandaid/bandage as soon as you are home.    ? You may resume light activities, as tolerated.    ? Resume your usual diet as tolerated.    ? It is strongly advised that you do not drive for 1-3 hours post injection.    ? If you have had oral sedation:  Do not drive for 8 hours post injection.      ? If you have had IV sedation:  Do not drive for 24 hours post injection.  Do not operate hazardous machinery or make important personal/business decisions for 24 hours.      POSSIBLE STEROID SIDE EFFECTS (If steroid/cortisone was used for your procedure)    -If you experience these symptoms, it should only last for a short period      Swelling of the legs                Skin redness (flushing)       Mouth (oral) irritation     Blood sugar (glucose) levels              Sweats                      Mood changes    Headache    Sleeplessness    Weakened immune system for up to 14  days, which could increase the risk of marquis the COVID-19 virus and/or experiencing more severe symptoms of the disease, if exposed.    Decreased effectiveness of the flu vaccine if given within 2 weeks of the steroid.         POSSIBLE PROCEDURE SIDE EFFECTS  -Call the Spine Center if you are concerned    Increased Pain             Increased numbness/tingling        Nausea/Vomiting            Bruising/bleeding at site        Redness or swelling                                                Difficulty walking        Weakness             Fever greater than 100.5    *In the event of a severe headache after an epidural steroid injection that is relieved by lying down, please call the [facility] to speak with a clinical staff member*

## 2021-06-17 NOTE — PATIENT INSTRUCTIONS - HE
Patient Instructions by Jihan Villanueva MD at 1/3/2019  9:40 AM     Author: Jihan Villanueva MD Service: -- Author Type: Physician    Filed: 1/3/2019 10:06 AM Encounter Date: 1/3/2019 Status: Addendum    : Jihan Villanueva MD (Physician)    Related Notes: Original Note by Jihan Villanueva MD (Physician) filed at 1/3/2019  9:59 AM       Geographic tongue.--Benign condition of unknown cause.  Nothing to worry about    Fluticasone nasal spray    Saline nasal spray    Lots of rest and fluids    Continue mucinex--double check on bottle that it's a non-drowsy formulation    Rx for amoxicillin--hold on to this and fill only if symptoms not improving by early next week      Patient Education     Viral Upper Respiratory Illness (Adult)  You have a viral upper respiratory illness (URI), which is another term for the common cold. This illness is contagious during the first few days. It is spread through the air by coughing and sneezing. It may also be spread by direct contact (touching the sick person and then touching your own eyes, nose, or mouth). Frequent handwashing will decrease risk of spread. Most viral illnesses go away within 7 to 10 days with rest and simple home remedies. Sometimes the illness may last for several weeks. Antibiotics will not kill a virus, and they are generally not prescribed for this condition.    Home care    If symptoms are severe, rest at home for the first 2 to 3 days. When you resume activity, don't let yourself get too tired.    Avoid being exposed to cigarette smoke (yours or others).    You may use acetaminophen or ibuprofen to control pain and fever, unless another medicine was prescribed. If you have chronic liver or kidney disease, have ever had a stomach ulcer or gastrointestinal bleeding, or are taking blood-thinning medicines, talk with your healthcare provider before using these medicines. Aspirin should never be given to anyone under 18 years of age who is ill with a viral infection  or fever. It may cause severe liver or brain damage.    Your appetite may be poor, so a light diet is fine. Avoid dehydration by drinking 6 to 8 glasses of fluids per day (water, soft drinks, juices, tea, or soup). Extra fluids will help loosen secretions in the nose and lungs.    Over-the-counter cold medicines will not shorten the length of time youre sick, but they may be helpful for the following symptoms: cough, sore throat, and nasal and sinus congestion. (Note: Do not use decongestants if you have high blood pressure.)  Follow-up care  Follow up with your healthcare provider, or as advised.  When to seek medical advice  Call your healthcare provider right away if any of these occur:    Cough with lots of colored sputum (mucus)    Severe headache; face, neck, or ear pain    Difficulty swallowing due to throat pain    Fever of 100.4 F (38 C) or higher, or as directed by your healthcare provider  Call 911  Call 911 if any of these occur:    Chest pain, shortness of breath, wheezing, or difficulty breathing    Coughing up blood    Inability to swallow due to throat pain  Date Last Reviewed: 9/13/2015 2000-2017 [employer]. [address] [place] [address], [place] [address]. All rights reserved. This information is not intended as a substitute for professional medical care. Always follow your healthcare professional's instructions.

## 2021-06-18 NOTE — LETTER
Letter by Jihan Villanueva MD at      Author: Jihan Villanueva MD Service: -- Author Type: --    Filed:  Encounter Date: 1/3/2019 Status: (Other)       January 3, 2019     Patient: Vinny Hernandez   YOB: 1980   Date of Visit: 1/3/2019       To Whom it May Concern:    Vinny Hernandez was seen in my clinic on 1/3/2019.    If you have any questions or concerns, please don't hesitate to call.    Sincerely,         Electronically signed by Jihan Villanueva MD

## 2021-06-19 NOTE — PROGRESS NOTES
"Assessment and Plan:     1. Hemorrhoids, unspecified hemorrhoid type  Will treat with proctosol cream.  Educated on its indications and side effects. Discussed keeping bowel movements soft.   - hydrocortisone (ANUSOL-HC) 2.5 % rectal cream; Apply to the affected area twice daily as needed.  Dispense: 28.35 g; Refill: 1    2. Rectal bleeding  Discussed that this likely from the hemorrhoid.  Due to family history of colon cancer, will refer to gastroenterology for further evaluation.   - Ambulatory referral to Gastroenterology    3. LLQ abdominal pain  Hemogram is unremarkable.  Differentials include diverticulitis, gastritis, colitis, nephrolithiasis, hernia.  Discussed symptomatic treatment including good hydration.  Patient may take tylenol as needed.  - Comprehensive Metabolic Panel    4. Inguinal pain of both sides  Will obtain ultrasound for further evaluation.  This may be contributing to the left lower abdominal pain.   - US Hernia Evaluation; Future    5. Polyuria  Urinalysis shows no UTI.  Will check glucose levels to rule out diabetes.   - Urinalysis-UC if Indicated    6. Fever, unspecified fever cause  This has resolved.  He will follow-up if symptoms persist or worsen.   - HM1(CBC and Differential)  - HM1 (CBC with Diff)        Subjective:     Vinny is a 38 y.o. male presenting to the clinic for concerns.  Patient states Friday morning, he woke up and felt a rectal lump.  He believed it was a hemorrhoid.  He then developed swelling within his groin region bilaterally.  He felt feverish, lightheaded, and sweaty.  He did not take his temperature.  He took ibuprofen.  He went camping on Saturday and had similar symptoms.  He laid down to rest.  Patient states his symptoms have improved, but he continues to feel \"cloudy\".  He does have some itching rectally when he wipes.  He has a history of rectal bleeding in the past in which he had a colonoscopy showing internal hemorrhoids in 2014.  His father had " "colon cancer so he is concerned about this.  He denies any black tarry stools.  He has had left lower abdominal discomfort intermittently.  He denies nausea, vomiting, constipation or diarrhea.  He typically has softer stools multiple times daily.  He does experience urinary frequency but attributes that to drinking a large amount of water.  He denies urinary urgency, hematuria, low back pain.  He was swimming in a lake in [place].    Review of Systems: A complete 14 point review of systems was obtained and is negative or as stated in the history of present illness.    Social History     Social History     Marital status: Single     Spouse name: N/A     Number of children: N/A     Years of education: N/A     Occupational History     Not on file.     Social History Main Topics     Smoking status: Passive Smoke Exposure - Never Smoker     Smokeless tobacco: Never Used     Alcohol use 5.0 oz/week     10 drink(s) per week     Drug use: No     Sexual activity: Not on file     Other Topics Concern     Not on file     Social History Narrative       Active Ambulatory Problems     Diagnosis Date Noted     Joint Pain, Localized In The Elbow      Closed Posterior Dislocation Of The Left Elbow      Acute Pharyngitis      Deviated Nasal Septum (Acquired)      Allergic Rhinitis      Bright red blood per rectum 12/07/2014     External hemorrhoids without mention of complication 12/07/2014     Preop examination 12/09/2014     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    Objective:     /84  Pulse 74  Ht 5' 10\" (1.778 m)  Wt 170 lb 4.8 oz (77.2 kg)  BMI 24.44 kg/m2    Patient is alert, in no obvious distress.   Skin: Warm, dry.  No lesions or rashes.  Skin turgor rapid return.   HEENT:  Head normocephalic, " atraumatic.  Eyes normal.  Ears normal.  Nose patent, mucosa pink.  Oropharynx mucosa pink.  No lesions or tonsillar enlargement.   Neck: Supple, no lymphadenopathy, JVD, bruits noted.  No thyromegaly.  Lungs:  Clear to auscultation. Respirations even and unlabored.  No wheezing or rales noted.   Heart:  Regular rate and rhythm.  No murmurs.  Abdomen: Soft, nontender.  Slight tenderness to palpation left lower abdomen.  Bowel sounds normoactive. No guarding or masses noted.   :  External genitalia normal.  Testicles descended x 2.  No hernias palpated bilaterally.  He is tender to palpation within both inguinal regions.  No palpable lymph nodes are present.   He has a small external rectal hemorrhoid which is non-thrombosed.

## 2021-06-21 NOTE — LETTER
Letter by Liz Maciel DO at      Author: Liz Maciel DO Service: -- Author Type: --    Filed:  Date of Service:  Status: (Other)       March 22, 2021     Patient: Vinny Hernandez   YOB: 1980   Date of Visit: 3/22/2021       To Whom It May Concern:    It is my medical opinion that Vinny Hernandez be off of work today, Monday March 22nd, due to a procedure that he had done at our facility.    If you have any questions or concerns, please don't hesitate to call.    Sincerely,        Dr. Liz Loco D.O.

## 2021-06-21 NOTE — LETTER
Letter by Liz Maciel DO at      Author: Liz Maciel DO Service: -- Author Type: --    Filed:  Date of Service:  Status: (Other)       February 8, 2021     Patient: Vinny Hernandez   YOB: 1980   Date of Visit: 2/8/2021       To Whom It May Concern:    It is my medical opinion that Vinny Hernandez be off of work today, Monday February 8th, 2021, due to a procedure that he had done at our facility.    If you have any questions or concerns, please don't hesitate to call.    Sincerely,        Dr. Liz Loco D.O.

## 2021-06-22 NOTE — PROGRESS NOTES
Assessment/Plan:     1. Acute sinusitis, recurrence not specified, unspecified location     2. Sore throat  Rapid Strep A Screen- Throat Swab    Group A Strep, RNA Direct Detection, Throat   3. Geographic tongue         Diagnoses and all orders for this visit:    Acute sinusitis, recurrence not specified, unspecified location  Rapid strep is negative.  Symptoms consistent with acute viral sinusitis.  Discussed that symptoms have been going on for just over a week so unlikely to have bacterial sinusitis at this time.  Discussed that bloody nasal discharge is likely due to dryness of mucous membranes along with swelling and inflammation associated with the infection.  Recommend use of humidifier at home.  Continue with Mucinex.  Encouraged rest and fluids.  Suggested saline spray as well as trial of fluticasone nasal spray.  Counseled on use of medications.  Provided prescription for amoxicillin but directed him to wait a few days to see if symptoms improved with ongoing symptomatic cares before filling this prescription.  He was comfortable with this plan.  Follow-up as needed if symptoms worsen or do not improve or new concerning symptoms develop.    Sore throat  -     Rapid Strep A Screen- Throat Swab  -     Group A Strep, RNA Direct Detection, Throat    Geographic tongue  Provided reassurance on benign nature of this condition.               Subjective:      Vinny Hernandez is a 38 y.o. male who comes in today with concern about cold and bloody nasal discharge for about 9 days.  Symptoms started on Desha day.  Initially had a severe sore throat that lasted about 3 days.  That has now resolved.  He has not had a coughing.  Nose is very congested and plugged.  Feels pressure across the top of his nose.  Feels postnasal drainage.  He has had some chills but no fevers.  Ears are feeling okay.  He started by taking Sudafed and then last evening took Mucinex.  This made him go to sleep.  He has not been using  any nasal sprays.  He has had previous tonsillectomy.  He is not a smoker.  He has no allergies.  Overall healthy individual and takes no regular medications.  Has had several sick contacts who have had cold symptoms recently.  He also reports concern about some unusual markings that will occur periodically on his tongue.  His tongue feels a little bit sore and with a burning sensation.  He has always attributed it to drinking coffee that was too hot.  Dentist advised that it could be a sign of B12 deficiency and he try to B12 supplement for a while which seemed to help a bit but not completely take care of the problem.  Review of systems is otherwise negative.  No other questions today.    Current Outpatient Medications   Medication Sig Dispense Refill     hydrocortisone (ANUSOL-HC) 2.5 % rectal cream Apply to the affected area twice daily as needed. 28.35 g 1     amoxicillin (AMOXIL) 500 MG capsule Take 1 capsule (500 mg total) by mouth 3 (three) times a day for 10 days. 30 capsule 0     fluticasone (FLONASE) 50 mcg/actuation nasal spray Instill 2 sprays in each nostril once daily as needed 16 g 3     No current facility-administered medications for this visit.        Past Medical History, Family History, and Social History reviewed.  History reviewed. No pertinent past medical history.  Past Surgical History:   Procedure Laterality Date     HAND SURGERY       HERNIA REPAIR       knee orthoscopic (right)       KNEE SURGERY       LARYNGOSCOPY N/A 12/12/2014    Procedure: DIRECT LARYNGOSCOPY AND BIOPSY LEFT VOCAL CORD;  Surgeon: Ernesto Galvez MD;  Location: [facility] OR;  Service:      TONSILLECTOMY       TONSILLECTOMY       No known drug allergies  History reviewed. No pertinent family history.  Social History     Socioeconomic History     Marital status: Single     Spouse name: Not on file     Number of children: Not on file     Years of education: Not on file     Highest education level: Not on file    Social Needs     Financial resource strain: Not on file     Food insecurity - worry: Not on file     Food insecurity - inability: Not on file     Transportation needs - medical: Not on file     Transportation needs - non-medical: Not on file   Occupational History     Not on file   Tobacco Use     Smoking status: Passive Smoke Exposure - Never Smoker     Smokeless tobacco: Never Used   Substance and Sexual Activity     Alcohol use: Yes     Alcohol/week: 5.0 oz     Types: 10 drink(s) per week     Drug use: No     Sexual activity: Not on file   Other Topics Concern     Not on file   Social History Narrative     Not on file         Review of systems is as stated in HPI, and the remainder of the 10 system review is otherwise negative.    Objective:     Vitals:    01/03/19 0938   BP: 124/82   Patient Site: Left Arm   Patient Position: Sitting   Cuff Size: Adult Large   Pulse: 76   Temp: 98.3  F (36.8  C)   TempSrc: Other   SpO2: 98%   Weight: 168 lb 5 oz (76.3 kg)    Body mass index is 24.15 kg/m .      General appearance: alert, appears stated age and cooperative  Head: Normocephalic, without obvious abnormality, atraumatic  Eyes: negative  Ears: normal TM's and external ear canals both ears  Nose: erythema and edema of nasal mucosa present  Throat: pharyngeal erythema present without exudate, geographic tongue present  Neck: mild anterior cervical adenopathy  Lungs: clear to auscultation bilaterally  Heart: regular rate and rhythm, S1, S2 normal, no murmur, click, rub or gallop    Recent Results (from the past 24 hour(s))   Rapid Strep A Screen- Throat Swab    Collection Time: 01/03/19 10:06 AM   Result Value Ref Range    Rapid Strep A Antigen No Group A Strep detected, presumptive negative No Group A Strep detected, presumptive negative       This note has been dictated using voice recognition software. Any grammatical or context distortions are unintentional and inherent to the the software.

## 2021-06-27 ENCOUNTER — HEALTH MAINTENANCE LETTER (OUTPATIENT)
Age: 41
End: 2021-06-27

## 2021-09-22 ENCOUNTER — E-VISIT (OUTPATIENT)
Dept: FAMILY MEDICINE | Facility: CLINIC | Age: 41
End: 2021-09-22
Payer: COMMERCIAL

## 2021-09-22 DIAGNOSIS — M54.6 ACUTE THORACIC BACK PAIN, UNSPECIFIED BACK PAIN LATERALITY: Primary | ICD-10-CM

## 2021-09-22 PROCEDURE — 99207 PR NON-BILLABLE SERV PER CHARTING: CPT | Performed by: NURSE PRACTITIONER

## 2021-09-22 NOTE — PATIENT INSTRUCTIONS
Thank you for choosing us for your care. I think an in-clinic visit would be best next steps based on your symptoms. Please schedule a clinic appointment; you won t be charged for this eVisit.      You can schedule an appointment right here in [facility], or call [phone]

## 2021-10-17 ENCOUNTER — HEALTH MAINTENANCE LETTER (OUTPATIENT)
Age: 41
End: 2021-10-17

## 2021-12-12 ENCOUNTER — HEALTH MAINTENANCE LETTER (OUTPATIENT)
Age: 41
End: 2021-12-12

## 2021-12-20 ENCOUNTER — E-VISIT (OUTPATIENT)
Dept: FAMILY MEDICINE | Facility: CLINIC | Age: 41
End: 2021-12-20
Payer: COMMERCIAL

## 2021-12-20 DIAGNOSIS — J01.90 ACUTE SINUSITIS WITH SYMPTOMS > 10 DAYS: Primary | ICD-10-CM

## 2021-12-20 PROCEDURE — 99421 OL DIG E/M SVC 5-10 MIN: CPT | Performed by: NURSE PRACTITIONER

## 2021-12-20 NOTE — PATIENT INSTRUCTIONS
Sinusitis (Antibiotic Treatment)    The sinuses are air-filled spaces within the bones of the face. They connect to the inside of the nose. Sinusitis is an inflammation of the tissue that lines the sinuses. Sinusitis can occur during a cold. It can also happen due to allergies to pollens and other particles in the air. Sinusitis can cause symptoms of sinus congestion and a feeling of fullness. A sinus infection causes fever, headache, and facial pain. There is often green or yellow fluid draining from the nose or into the back of the throat (post-nasal drip). You have been given antibiotics to treat this condition.   Home care    Take the full course of antibiotics as instructed. Don't stop taking them, even when you feel better.    Drink plenty of water, hot tea, and other liquids as directed by the healthcare provider. This may help thin nasal mucus. It also may help your sinuses drain fluids.    Heat may help soothe painful areas of your face. Use a towel soaked in hot water. Or,  the shower and direct the warm spray onto your face. Using a vaporizer along with a menthol rub at night may also help soothe symptoms.     An expectorant with guaifenesin may help thin nasal mucus and help your sinuses drain fluids. Talk with your provider or pharmacists before taking an over-the-counter (OTC) medicine if you have any questions about it or its side effects..    You can use an OTC decongestant, unless a similar medicine was prescribed to you. Nasal sprays work the fastest. Use one that contains phenylephrine or oxymetazoline. First blow your nose gently. Then use the spray. Don't use these medicines more often than directed on the label. If you do, your symptoms may get worse. You may also take pills that contain pseudoephedrine. Don t use products that combine multiple medicines. This is because side effects may be increased. Read labels. You can also ask the pharmacist for help. (People with high blood  pressure should not use decongestants. They can raise blood pressure.) Talk with your provider or pharmacist if you have any questions about the medicine..    OTC antihistamines may help if allergies contributed to your sinusitis. Talk with your provider or pharmacist if you have any questions about the medicine..    Don't use nasal rinses or irrigation during an acute sinus infection, unless your healthcare provider tells you to. Rinsing may spread the infection to other areas in your sinuses.    Use acetaminophen or ibuprofen to control pain, unless another pain medicine was prescribed to you. If you have chronic liver or kidney disease or ever had a stomach ulcer, talk with your healthcare provider before using these medicines. Never give aspirin to anyone under age 18 who is ill with a fever. It may cause severe liver damage.    Don't smoke. This can make symptoms worse.    Follow-up care  Follow up with your healthcare provider, or as advised.   When to seek medical advice  Call your healthcare provider if any of these occur:     Facial pain or headache that gets worse    Stiff neck    Unusual drowsiness or confusion    Swelling of your forehead or eyelids    Symptoms don't go away in 10 days    Vision problems, such as blurred or double vision    Fever of 100.4 F (38 C) or higher, or as directed by your healthcare provider  Call 911  Call 911 if any of these occur:     Seizure    Trouble breathing    Feeling dizzy or faint    Fingernails, skin or lips look blue, purple , or gray  Prevention  Here are steps you can take to help prevent an infection:     Keep good hand washing habits.    Don t have close contact with people who have sore throats, colds, or other upper respiratory infections.    Don t smoke, and stay away from secondhand smoke.    Stay up to date with of your vaccines.  [employer] last reviewed this educational content on 12/1/2019 2000-2021 [employer]. All rights reserved. This  information is not intended as a substitute for professional medical care. Always follow your healthcare professional's instructions.        Dear Vinny Hernandez    After reviewing your responses, I've been able to diagnose you with?a sinus infection caused by bacteria.?     Based on your responses and diagnosis, I have prescribed Augmentin to treat your symptoms. I have sent this to your pharmacy.?     It is also important to stay well hydrated, get lots of rest and take over-the-counter decongestants,?tylenol?or ibuprofen if you?are able to?take those medications per your primary care provider to help relieve discomfort.?     It is important that you take?all of?your prescribed medication even if your symptoms are improving after a few doses.? Taking?all of?your medicine helps prevent the symptoms from returning.?     If your symptoms worsen, you develop severe headache, vomiting, high fever (>102), or are not improving in 7 days, please contact your primary care provider for an appointment or visit any of our convenient Walk-in Care or Urgent Care Centers to be seen which can be found on our website?here.?     Thanks again for choosing?us?as your health care partner,?   ?  CHERRI Barba CNP?

## 2022-04-21 ENCOUNTER — OFFICE VISIT (OUTPATIENT)
Dept: FAMILY MEDICINE | Facility: CLINIC | Age: 42
End: 2022-04-21
Payer: COMMERCIAL

## 2022-04-21 VITALS
SYSTOLIC BLOOD PRESSURE: 120 MMHG | HEART RATE: 66 BPM | BODY MASS INDEX: 24.98 KG/M2 | HEIGHT: 70 IN | WEIGHT: 174.5 LBS | DIASTOLIC BLOOD PRESSURE: 64 MMHG

## 2022-04-21 DIAGNOSIS — M79.641 PAIN IN BOTH HANDS: ICD-10-CM

## 2022-04-21 DIAGNOSIS — Z00.00 ENCOUNTER FOR ROUTINE HISTORY AND PHYSICAL EXAM FOR MALE: Primary | ICD-10-CM

## 2022-04-21 DIAGNOSIS — M79.642 PAIN IN BOTH HANDS: ICD-10-CM

## 2022-04-21 DIAGNOSIS — M54.2 NECK PAIN: ICD-10-CM

## 2022-04-21 DIAGNOSIS — J34.2 DEVIATED SEPTUM: ICD-10-CM

## 2022-04-21 DIAGNOSIS — D36.9 DERMOID CYST: ICD-10-CM

## 2022-04-21 LAB
ERYTHROCYTE [SEDIMENTATION RATE] IN BLOOD BY WESTERGREN METHOD: 2 MM/HR (ref 0–15)
RHEUMATOID FACT SER NEPH-ACNC: <15 IU/ML (ref 0–30)

## 2022-04-21 PROCEDURE — 85652 RBC SED RATE AUTOMATED: CPT | Performed by: NURSE PRACTITIONER

## 2022-04-21 PROCEDURE — 99214 OFFICE O/P EST MOD 30 MIN: CPT | Mod: 25 | Performed by: NURSE PRACTITIONER

## 2022-04-21 PROCEDURE — 86431 RHEUMATOID FACTOR QUANT: CPT | Performed by: NURSE PRACTITIONER

## 2022-04-21 PROCEDURE — 86038 ANTINUCLEAR ANTIBODIES: CPT | Performed by: NURSE PRACTITIONER

## 2022-04-21 PROCEDURE — 36415 COLL VENOUS BLD VENIPUNCTURE: CPT | Performed by: NURSE PRACTITIONER

## 2022-04-21 PROCEDURE — 99395 PREV VISIT EST AGE 18-39: CPT | Performed by: NURSE PRACTITIONER

## 2022-04-21 RX ORDER — METHOCARBAMOL 750 MG/1
750 TABLET, FILM COATED ORAL 3 TIMES DAILY PRN
Qty: 30 TABLET | Refills: 3 | Status: SHIPPED | OUTPATIENT
Start: 2022-04-21 | End: 2024-01-18

## 2022-04-21 NOTE — PROGRESS NOTES
Assessment and Plan:     Encounter for routine history and physical exam for male  Recommend consuming a healthy diet and exercising.  He declines HIV and hepatitis C screening.  He is up-to-date on vaccinations.  He is not fasting today.  Recommend follow-up for fasting labs in the future.    Neck pain  Discussed treatment options.  Provided prescription for methocarbamol to take as needed.  He is to avoid taking this with other sedatives.  Recommend rest, ice, stretching activities.  - methocarbamol (ROBAXIN) 750 MG tablet  Dispense: 30 tablet; Refill: 3    Pain in both hands  We will rule out autoimmune disease including rheumatoid arthritis.  Further plans pending the results.  - Anti Nuclear Ann-Marie IgG by IFA with Reflex  - Rheumatoid factor  - ESR: Erythrocyte sedimentation rate  - Anti Nuclear Ann-Marie IgG by IFA with Reflex  - Rheumatoid factor  - ESR: Erythrocyte sedimentation rate    Dermoid cyst  Will refer to dermatology for further evaluation.  - Adult Dermatology Referral    Deviated septum  Will refer to ENT for follow-up.  - Otolaryngology Referral        Subjective:     Vinny is a 41 year old male presenting to the clinic for a male physical.  He has been  for 10 years.  He has 2 children, ages 5 (girl) and 4 (boy).  He is consuming a healthy diet and exercising regularly.  He suffers from chronic neck pain.  He has seen the spine clinic and has tried PT and injections.  He is interested in using a muscle relaxant.  He does have questions regarding medical marijuana.  He has also noticed cramping within his hands and feet intermittently for one year.  This occurs when he is grasping objects or playing with his kids.  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  His maternal uncle had arthritis.  Patient has been experiencing itchy eyes, rhinorrhea, and sinus congestion for 3 weeks.  This is worse at bedtime.  He has tried Flonase and Zyrtec intermittently. He would like a referral to follow-up with  ENT.  He was supposed to follow-up after deviated septum nasal surgery. Lastly, he has a small cyst present on his left forehead.  It has not changed in size.  He would like this excised.      Answers for HPI/ROS submitted by the patient on 4/21/2022  Frequency of exercise:: 4-5 days/week  Getting at least 3 servings of Calcium per day:: Yes  Diet:: Regular (no restrictions)  Taking medications regularly:: Not Applicable  Medication side effects:: Not applicable  Bi-annual eye exam:: NO  Dental care twice a year:: Yes  Sleep apnea or symptoms of sleep apnea:: None  Additional concerns today:: No  Duration of exercise:: 45-60 minutes      Reviewof Systems: A complete 14 point review of systems was obtained and is negative or as stated in the history of present illness.    Social History     Socioeconomic History     Marital status:      Spouse name: Not on file     Number of children: Not on file     Years of education: Not on file     Highest education level: Not on file   Occupational History     Not on file   Tobacco Use     Smoking status: Never Smoker     Smokeless tobacco: Never Used   Substance and Sexual Activity     Alcohol use: Yes     Alcohol/week: 15.0 standard drinks     Drug use: No     Sexual activity: Not on file   Other Topics Concern     Not on file   Social History Narrative     Not on file     Social Determinants of Health     Financial Resource Strain: Not on file   Food Insecurity: Not on file   Transportation Needs: Not on file   Physical Activity: Not on file   Stress: Not on file   Social Connections: Not on file   Intimate Partner Violence: Not on file   Housing Stability: Not on file       Active Ambulatory Problems     Diagnosis Date Noted     Deviated Nasal Septum (Acquired)      Allergic Rhinitis      External hemorrhoids without mention of complication 12/07/2014     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   Skin: Warm, dry.  No lesions or rashes.  Skin turgor rapid return.   HEENT:  Head normocephalic, atraumatic.  Eyes normal.  PERRL.  EOM's intact.  No nystagmus. Ears normal.  Nose patent, mucosa pink.  Oropharynx mucosa pink.  No lesions or tonsillar enlargement.   Neck: Supple, no lymphadenopathy, JVD, bruits noted.  No thyromegaly.  Lungs:  Clear to auscultation. Respirations even and unlabored.  No wheezing or rales noted.   Heart:  Regular rate and rhythm.  No murmurs, S3, S4, gallops, or rubs.    Abdomen: Soft, nontender.  No organomegaly. Bowel sounds normoactive. No guarding or masses noted.   :  deferred  Musculoskeletal:  Full ROM of extremities.     Neurological:  Cranial nerves 2-12 intact.                  "

## 2022-04-22 LAB — ANA SER QL IF: NEGATIVE

## 2022-05-05 ENCOUNTER — MYC MEDICAL ADVICE (OUTPATIENT)
Dept: FAMILY MEDICINE | Facility: CLINIC | Age: 42
End: 2022-05-05
Payer: COMMERCIAL

## 2022-05-05 NOTE — TELEPHONE ENCOUNTER
Sent [facility] phone number to patient via [employer] to schedule his ENT appointment.  Dior Perkins RN on 5/5/2022 at 11:34 AM

## 2022-10-02 ENCOUNTER — HEALTH MAINTENANCE LETTER (OUTPATIENT)
Age: 42
End: 2022-10-02

## 2023-04-20 ENCOUNTER — PATIENT OUTREACH (OUTPATIENT)
Dept: CARE COORDINATION | Facility: CLINIC | Age: 43
End: 2023-04-20
Payer: COMMERCIAL

## 2023-06-04 ENCOUNTER — HEALTH MAINTENANCE LETTER (OUTPATIENT)
Age: 43
End: 2023-06-04

## 2023-10-09 ENCOUNTER — TRANSFERRED RECORDS (OUTPATIENT)
Dept: HEALTH INFORMATION MANAGEMENT | Facility: CLINIC | Age: 43
End: 2023-10-09
Payer: COMMERCIAL

## 2024-01-18 ENCOUNTER — OFFICE VISIT (OUTPATIENT)
Dept: FAMILY MEDICINE | Facility: CLINIC | Age: 44
End: 2024-01-18
Payer: COMMERCIAL

## 2024-01-18 VITALS
DIASTOLIC BLOOD PRESSURE: 82 MMHG | RESPIRATION RATE: 12 BRPM | HEART RATE: 70 BPM | TEMPERATURE: 98.2 F | OXYGEN SATURATION: 97 % | HEIGHT: 71 IN | BODY MASS INDEX: 23.8 KG/M2 | SYSTOLIC BLOOD PRESSURE: 117 MMHG | WEIGHT: 170 LBS

## 2024-01-18 DIAGNOSIS — Z13.220 LIPID SCREENING: ICD-10-CM

## 2024-01-18 DIAGNOSIS — Z00.00 ENCOUNTER FOR ROUTINE HISTORY AND PHYSICAL EXAM FOR MALE: Primary | ICD-10-CM

## 2024-01-18 DIAGNOSIS — Z12.11 COLON CANCER SCREENING: ICD-10-CM

## 2024-01-18 DIAGNOSIS — J32.9 CHRONIC CONGESTION OF PARANASAL SINUS: ICD-10-CM

## 2024-01-18 PROCEDURE — 99213 OFFICE O/P EST LOW 20 MIN: CPT | Mod: 25 | Performed by: NURSE PRACTITIONER

## 2024-01-18 PROCEDURE — 80061 LIPID PANEL: CPT | Performed by: NURSE PRACTITIONER

## 2024-01-18 PROCEDURE — 80053 COMPREHEN METABOLIC PANEL: CPT | Performed by: NURSE PRACTITIONER

## 2024-01-18 PROCEDURE — 90471 IMMUNIZATION ADMIN: CPT | Performed by: NURSE PRACTITIONER

## 2024-01-18 PROCEDURE — 99396 PREV VISIT EST AGE 40-64: CPT | Mod: 25 | Performed by: NURSE PRACTITIONER

## 2024-01-18 PROCEDURE — 36415 COLL VENOUS BLD VENIPUNCTURE: CPT | Performed by: NURSE PRACTITIONER

## 2024-01-18 PROCEDURE — 90686 IIV4 VACC NO PRSV 0.5 ML IM: CPT | Performed by: NURSE PRACTITIONER

## 2024-01-18 ASSESSMENT — ENCOUNTER SYMPTOMS
JOINT SWELLING: 0
HEMATOCHEZIA: 0
EYE PAIN: 0
PALPITATIONS: 0
MYALGIAS: 0
HEADACHES: 0
FEVER: 0
CONSTIPATION: 0
DYSURIA: 0
WEAKNESS: 0
ABDOMINAL PAIN: 0
PARESTHESIAS: 0
DIARRHEA: 0
HEARTBURN: 0
CHILLS: 0
DIZZINESS: 0
SHORTNESS OF BREATH: 0
SORE THROAT: 0
NERVOUS/ANXIOUS: 0
ARTHRALGIAS: 1
NAUSEA: 0
FREQUENCY: 0
HEMATURIA: 0
COUGH: 0

## 2024-01-18 ASSESSMENT — PAIN SCALES - GENERAL: PAINLEVEL: NO PAIN (0)

## 2024-01-18 NOTE — PROGRESS NOTES
Assessment and Plan:     Encounter for routine history and physical exam for male  Recommend consuming a healthy diet and exercising.  He declines HIV and hepatitis C screening.  Influenza vaccine provided.  He declines COVID and hepatitis B vaccines.  - Comprehensive metabolic panel (BMP + Alb, Alk Phos, ALT, AST, Total. Bili, TP)    Lipid screening  - Lipid panel reflex to direct LDL Fasting    Colon cancer screening  - Colonoscopy Screening  Referral    Chronic congestion of paranasal sinus  Will refer to ENT for further evaluation.  - Adult ENT  Referral        Subjective:     Vinny is a 43 year old male presenting to the clinic for a male physical.  Patient has been  for 11 years.  He has 2 children, ages 7 (girl) and 6 (boy).  Patient had labs performed this summer that showed a total cholesterol of 263, HDL 66, , triglycerides 115.  His glucose was 92.  He is consuming a healthy diet.  He has not exercised for the past 6 months.  Patient plans on resuming activity.  He has a history of deviated nasal septum repair 3 years ago.  He continues to experience chronic sinus congestion.  He has tried Flonase and Zyrtec intermittently.  His father has a history of rectal cancer.  Patient is due for a colonoscopy.    Reviewof Systems: A complete 14 point review of systems was obtained and is negative or as stated in the history of present illness.    Social History     Socioeconomic History    Marital status:      Spouse name: Not on file    Number of children: Not on file    Years of education: Not on file    Highest education level: Not on file   Occupational History    Not on file   Tobacco Use    Smoking status: Never     Passive exposure: Never    Smokeless tobacco: Never   Vaping Use    Vaping Use: Never used   Substance and Sexual Activity    Alcohol use: Yes     Alcohol/week: 15.0 standard drinks of alcohol    Drug use: No    Sexual activity: Not on file   Other Topics  Concern    Not on file   Social History Narrative    Not on file     Social Determinants of Health     Financial Resource Strain: Low Risk  (1/18/2024)    Financial Resource Strain     Within the past 12 months, have you or your family members you live with been unable to get utilities (heat, electricity) when it was really needed?: No   Food Insecurity: Low Risk  (1/18/2024)    Food Insecurity     Within the past 12 months, did you worry that your food would run out before you got money to buy more?: No     Within the past 12 months, did the food you bought just not last and you didn t have money to get more?: No   Transportation Needs: Low Risk  (1/18/2024)    Transportation Needs     Within the past 12 months, has lack of transportation kept you from medical appointments, getting your medicines, non-medical meetings or appointments, work, or from getting things that you need?: No   Physical Activity: Not on file   Stress: Not on file   Social Connections: Not on file   Interpersonal Safety: Low Risk  (1/18/2024)    Interpersonal Safety     Do you feel physically and emotionally safe where you currently live?: Yes     Within the past 12 months, have you been hit, slapped, kicked or otherwise physically hurt by someone?: No     Within the past 12 months, have you been humiliated or emotionally abused in other ways by your partner or ex-partner?: No   Housing Stability: Low Risk  (1/18/2024)    Housing Stability     Do you have housing? 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   Skin: Warm, dry.  No lesions or rashes.  Skin turgor rapid return.   HEENT:  Head normocephalic, atraumatic.  Eyes normal.  Ears normal.  Nose patent, mucosa pink.  Oropharynx mucosa pink.  No lesions or tonsillar enlargement.   Neck: Supple, no lymphadenopathy.  No thyromegaly.  Lungs:  Clear to auscultation. Respirations even and unlabored.  No wheezing or rales noted.   Heart:  Regular rate and rhythm.  No murmurs, S3, S4, gallops, or rubs.    Abdomen: Soft, nontender.  No organomegaly. Bowel sounds normoactive. No guarding or masses noted.   :  deferred  Musculoskeletal:  Full ROM of extremities.  DTRs symmetrical, sensations intact.  No obvious deformity.  Muscle strength equal +5/5.   Neurological:  Cranial nerves 2-12 intact.          Answers submitted by the patient for this visit:  Annual Preventive Visit (Submitted on 1/18/2024)  Chief Complaint: Annual Exam:  Frequency of exercise:: None  Getting at least 3 servings of Calcium per day:: NO  Diet:: Regular (no restrictions)  Taking medications regularly:: Yes  Medication side effects:: Not applicable  Bi-annual eye exam:: NO  Dental care twice a year:: Yes  Sleep apnea or symptoms of sleep apnea:: None  abdominal pain: No  Blood in stool: No  Blood in urine: No  chest pain: No  chills: No  congestion: No  constipation: No  cough: No  diarrhea: No  dizziness: No  ear pain: Yes  eye pain: No  nervous/anxious: No  fever: No  frequency: No  genital sores: No  headaches: No  hearing loss: Yes  heartburn: No  arthralgias: Yes  joint swelling: No  peripheral edema: No  mood changes: No  myalgias: No  nausea: No  dysuria: No  palpitations: No  Skin sensation changes: No  sore throat: " No  urgency: No  rash: No  shortness of breath: No  visual disturbance: No  weakness: No  impotence: No  penile discharge: No  Additional concerns today:: No

## 2024-01-19 LAB
ALBUMIN SERPL BCG-MCNC: 4.8 G/DL (ref 3.5–5.2)
ALP SERPL-CCNC: 49 U/L (ref 40–150)
ALT SERPL W P-5'-P-CCNC: 17 U/L (ref 0–70)
ANION GAP SERPL CALCULATED.3IONS-SCNC: 7 MMOL/L (ref 7–15)
AST SERPL W P-5'-P-CCNC: 17 U/L (ref 0–45)
BILIRUB SERPL-MCNC: 0.5 MG/DL
BUN SERPL-MCNC: 14.6 MG/DL (ref 6–20)
CALCIUM SERPL-MCNC: 9.3 MG/DL (ref 8.6–10)
CHLORIDE SERPL-SCNC: 103 MMOL/L (ref 98–107)
CHOLEST SERPL-MCNC: 261 MG/DL
CREAT SERPL-MCNC: 1.18 MG/DL (ref 0.67–1.17)
DEPRECATED HCO3 PLAS-SCNC: 30 MMOL/L (ref 22–29)
EGFRCR SERPLBLD CKD-EPI 2021: 80 ML/MIN/1.73M2
FASTING STATUS PATIENT QL REPORTED: YES
GLUCOSE SERPL-MCNC: 90 MG/DL (ref 70–99)
HDLC SERPL-MCNC: 58 MG/DL
LDLC SERPL CALC-MCNC: 180 MG/DL
NONHDLC SERPL-MCNC: 203 MG/DL
POTASSIUM SERPL-SCNC: 4.4 MMOL/L (ref 3.4–5.3)
PROT SERPL-MCNC: 7.4 G/DL (ref 6.4–8.3)
SODIUM SERPL-SCNC: 140 MMOL/L (ref 135–145)
TRIGL SERPL-MCNC: 113 MG/DL

## 2024-01-20 PROBLEM — E78.2 MIXED HYPERLIPIDEMIA: Status: ACTIVE | Noted: 2024-01-20

## 2024-01-25 ENCOUNTER — OFFICE VISIT (OUTPATIENT)
Dept: FAMILY MEDICINE | Facility: CLINIC | Age: 44
End: 2024-01-25
Payer: COMMERCIAL

## 2024-01-25 VITALS
DIASTOLIC BLOOD PRESSURE: 70 MMHG | SYSTOLIC BLOOD PRESSURE: 110 MMHG | WEIGHT: 170 LBS | HEIGHT: 71 IN | HEART RATE: 75 BPM | RESPIRATION RATE: 16 BRPM | TEMPERATURE: 97.6 F | BODY MASS INDEX: 23.8 KG/M2 | OXYGEN SATURATION: 99 %

## 2024-01-25 DIAGNOSIS — Z30.09 ENCOUNTER FOR VASECTOMY COUNSELING: Primary | ICD-10-CM

## 2024-01-25 PROCEDURE — 99213 OFFICE O/P EST LOW 20 MIN: CPT | Performed by: FAMILY MEDICINE

## 2024-01-25 ASSESSMENT — PAIN SCALES - GENERAL: PAINLEVEL: NO PAIN (0)

## 2024-01-25 NOTE — PROGRESS NOTES
"Assessment/Plan:    Encounter for vasectomy counseling  Contraceptive counseling reviewed.  Patient desires permanent sterilization.  Prevasectomy literature was provided.  Risk benefits and alternatives discussed.  Patient elects to schedule for vasectomy at earliest convenience currently scheduled February 9, 2023.               Subjective:    Vinny Hernandez is seen today for contraception counseling.  Desires permanent sterilization.  .  2 children.  No personal or family history of bleeding disorder or anesthetic reaction.  Patient describes himself as being \"resistant to pain medication\".  Past medical social and family history reviewed and updated as noted below.       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     Social History     Tobacco Use    Smoking status: Never     Passive exposure: Never    Smokeless tobacco: Never   Vaping Use    Vaping Use: Never used   Substance Use Topics    Alcohol use: Yes     Alcohol/week: 10.0 standard drinks of alcohol     Types: 10 Standard drinks or equivalent per week    Drug use: No        No current outpatient medications on file.          Objective:    Vitals:    01/25/24 1048   BP: 110/70   Pulse: 75   Resp: 16   Temp: 97.6 " " F (36.4  C)   SpO2: 99%   Weight: 77.1 kg (170 lb)   Height: 1.791 m (5' 10.5\")      Body mass index is 24.05 kg/m .    Alert.  No apparent distress.  Genitalia with circumcised male.  Testes descended bilaterally.  No hydrocele or varicocele.  No inguinal hernia.      This note has been dictated using voice recognition software and as a result may contain minor grammatical errors and unintended word substitutions.         Answers submitted by the patient for this visit:  General Questionnaire (Submitted on 1/25/2024)  Chief Complaint: Chronic problems general questions HPI Form  What is the reason for your visit today? : consultation  How many servings of fruits and vegetables do you eat daily?: 0-1  On average, how many sweetened beverages do you drink each day (Examples: soda, juice, sweet tea, etc.  Do NOT count diet or artificially sweetened beverages)?: 1  How many minutes a day do you exercise enough to make your heart beat faster?: 9 or less  How many days a week do you exercise enough to make your heart beat faster?: 3 or less  How many days per week do you miss taking your medication?: 0    "

## 2024-02-09 ENCOUNTER — OFFICE VISIT (OUTPATIENT)
Dept: FAMILY MEDICINE | Facility: CLINIC | Age: 44
End: 2024-02-09
Payer: COMMERCIAL

## 2024-02-09 VITALS
HEIGHT: 71 IN | HEART RATE: 70 BPM | RESPIRATION RATE: 18 BRPM | DIASTOLIC BLOOD PRESSURE: 78 MMHG | SYSTOLIC BLOOD PRESSURE: 110 MMHG | BODY MASS INDEX: 23.24 KG/M2 | WEIGHT: 166 LBS | OXYGEN SATURATION: 99 % | TEMPERATURE: 97.2 F

## 2024-02-09 DIAGNOSIS — Z30.2 ENCOUNTER FOR VASECTOMY: Primary | ICD-10-CM

## 2024-02-09 PROCEDURE — 55250 REMOVAL OF SPERM DUCT(S): CPT | Performed by: FAMILY MEDICINE

## 2024-02-09 PROCEDURE — 88302 TISSUE EXAM BY PATHOLOGIST: CPT | Performed by: PATHOLOGY

## 2024-02-09 ASSESSMENT — PAIN SCALES - GENERAL: PAINLEVEL: NO PAIN (0)

## 2024-02-09 NOTE — PROGRESS NOTES
Vasectomy Procedure Note    Indications: 43 year old male desiring permanent sterilization    Pre-operative Diagnosis: Undesired fertility    Post-operative Diagnosis: Undesired fertility    Anesthesia: 1% lidocaine, 3 ml    Procedure Details:    Vinny Hernandez  is seen today for scheduled vasectomy.  Patient desires permanent sterilization.  Consent form previously reviewed and signed by patient.   Consent form reviewed prior to procedure with physician statement signed.   Patient elects to continue with scheduled procedure.       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  Right vas deferens isolated subcutaneously.  1% lidocaine injected superficially then to vas deferens.  15 blade incision performed.  Curved hemostat for blunt dissection.  Towel clip for vas deferens isolation.  Vas deferens sheath removed exposing normal-appearing vas deferens.  4-O chromic suture both proximal and distal segment of vas deferens with central portion excised for pathology.  Electrocautery of vas deferens ends performed.  Distal end then replacing into fascia with 5-0 chromic suture.  Good hemostasis noted.  Vas deferens then replacing into scrotum.  Single 4-0 chromic suture for skin incision closure.    Left vas deferens then isolated in a similar fashion.  1% lidocaine injected superficially and then to level of vas deferens and surrounding tissue.  15 blade incision performed.  Curved hemostat for blunt dissection.  Towel clip for vas deferens isolation.  Vas deferens sheath removed exposing normal-appearing vas deferens.  4-0 chromic suture both proximal and distal segment of vas deferens  with central portion excised for pathology.  Electrocautery of vas deferens ends performed.  Distal and replaced in the fascia with 5-0 chromic suture.  Good hemostasis noted.  Vas deferens then replaced into scrotum.  Single 4-0 chromic for skin incision closure.  Triple antibiotic with 4 x 4 gauze pads placed at bilateral incision sites.      Specimen: vas segment, bilateral    Condition: Stable    Complications: none    Plan:  1. Continue contraception until negative sperm analysis. Semen count after 20-25 ejaculations and 12 weeks s/p vasectomy.  2. Warning signs of infection were reviewed.   3. Written home care instructions provided.  Backup contraception until confirmed sterility.  May resume normal bathing after 24 hours.  Avoid strenuous activity times one week.  Ibuprofen or Tylenol OTC for pain management.  Notify with increased swelling, bleeding, or drainage.  Postvasectomy semen analysis in 12 weeks after 20-25 ejaculations to confirm desired sterility.  Call the clinic if excessive pain, bleeding or swelling.

## 2024-02-13 LAB
PATH REPORT.COMMENTS IMP SPEC: NORMAL
PATH REPORT.COMMENTS IMP SPEC: NORMAL
PATH REPORT.FINAL DX SPEC: NORMAL
PATH REPORT.GROSS SPEC: NORMAL
PATH REPORT.MICROSCOPIC SPEC OTHER STN: NORMAL
PATH REPORT.RELEVANT HX SPEC: NORMAL
PHOTO IMAGE: NORMAL

## 2024-07-10 ENCOUNTER — OFFICE VISIT (OUTPATIENT)
Dept: OTOLARYNGOLOGY | Facility: CLINIC | Age: 44
End: 2024-07-10
Attending: NURSE PRACTITIONER
Payer: COMMERCIAL

## 2024-07-10 DIAGNOSIS — H91.91 HEARING DIFFICULTY OF RIGHT EAR: Primary | ICD-10-CM

## 2024-07-10 DIAGNOSIS — J32.9 CHRONIC CONGESTION OF PARANASAL SINUS: ICD-10-CM

## 2024-07-10 DIAGNOSIS — H74.11: ICD-10-CM

## 2024-07-10 PROCEDURE — 99203 OFFICE O/P NEW LOW 30 MIN: CPT | Performed by: OTOLARYNGOLOGY

## 2024-07-10 RX ORDER — AZELASTINE 1 MG/ML
SPRAY, METERED NASAL
Qty: 30 ML | Refills: 11 | Status: SHIPPED | OUTPATIENT
Start: 2024-07-10

## 2024-07-10 NOTE — PATIENT INSTRUCTIONS
CT sinus at [facility] as directed  Records from [facility]  Return visit 3 months with hearing test

## 2024-07-10 NOTE — PROGRESS NOTES
CHIEF COMPLAINT: Patient presents with:  Consult: Deviated Septum repair 2yrs ago, Chronic Ear pressure/fullness, occasional dull throbbing pain in both ears,  Facial pain and pressure, SNOT Total 47         HISTORY OF PRESENT ILLNESS    Dilip was seen at the behest of Li Poe CNP for sinus congestion. He has a history of septoplasty at [facility].  He complains of right sided hearing loss.     Sino-Nasal Outcome Test (SNOT - 22)    1. Need to Blow Nose: (P) Moderate  2. Nasal Blockage: (P) Moderate  3. Sneezing: (P) Mild or slight  4. Runny Nose: (P) Moderate  5. Cough: (P) Very mild  6. Post-nasal discharge: (P) Severe  7. Thick nasal discharge: (P) Moderate  8. Ear fullness: (P) Severe  9. Dizziness: (P) Mild or slight  10. Ear Pain: (P) Moderate  11. Facial pain/pressure: (P) Moderate  12. Decreased Sense of Smell/Taste: (P) None  13. Difficulty falling asleep: (P) Mild or slight  14. Wake up at night: (P) Mild or slight  15. Lack of a good night's sleep: (P) Mild or slight  16. Wake up tired: (P) Moderate  17. Fatigue: (P) Mild or slight  18. Reduced Productivity: (P) Mild or slight  19. Reduced Concentration: (P) Mild or slight  20. Frustrated/restless/irritable: (P) Very mild  21. Sad: (P) None  22. Embarrassed: (P) None    Total Score: (P) 47    COPYRIGHT 1996. [facility] [place]      REVIEW OF SYSTEMS    Review of Systems as per HPI and PMHx, otherwise 10 system review system are negative.       ALLERGIES    Patient has no known allergies.    CURRENT MEDICATIONS      Current Outpatient Medications:     azelastine (ASTELIN) 0.1 % nasal spray, 2 sprays each nostril 1-2x daily as needed for nasal congestion (use nightly for first 2 week), Disp: 30 mL, Rfl: 11     PAST MEDICAL HISTORY    PAST MEDICAL HISTORY: No past medical history on file.    PAST SURGICAL HISTORY    PAST SURGICAL HISTORY:   Past Surgical History:   Procedure Laterality Date    HAND SURGERY      HERNIA  REPAIR      KNEE SURGERY      LARYNGOSCOPY N/A 2014    Procedure: DIRECT LARYNGOSCOPY AND BIOPSY LEFT VOCAL CORD;  Surgeon: Ernesto Galvez MD;  Location: [facility];  Service:     OTHER SURGICAL HISTORY      knee orthoscopic (right)    TONSILLECTOMY      TONSILLECTOMY         FAMILY  HISTORY    FAMILY HISTORY:   Family History   Problem Relation Age of Onset    Colon Cancer Father        SOCIAL HISTORY    SOCIAL HISTORY:   Social History     Tobacco Use    Smoking status: Never     Passive exposure: Never    Smokeless tobacco: Never   Substance Use Topics    Alcohol use: Yes     Alcohol/week: 10.0 standard drinks of alcohol     Types: 10 Standard drinks or equivalent per week        PHYSICAL EXAM    HEAD: Normal appearance and symmetry:  No cutaneous lesions.      NECK:  supple     EARS:    Right:   inducopexy with retracted TM   LEFT:   TM atelectatic, retracted    EYES:  EOMI    CN VII/XII:  intact     NOSE:     Dorsum:   straight  Septum:  midline  Mucosa:  moist        ORAL CAVITY/OROPHARYNX:     Lips:  Normal.  Tongue: normal, midline  Mucosa:   no lesions     NECK:  Trachea:  midline.              Thyroid:  normal              Adenopathy:  none        NEURO:   Alert and Oriented     GAIT AND STATION:  normal     RESPIRATORY:   Symmetry and Respiratory effort     PSYCH:  Normal mood and affect     SKIN:   warm and dry         IMPRESSION:    Encounter Diagnoses   Name Primary?    Chronic congestion of paranasal sinus     Hearing difficulty of right ear Yes    Adhesions of drum head to incus, right           RECOMMENDATIONS:      Orders Placed This Encounter   Procedures    CT Sinus w/o Contrast    Adult Audiology  Referral      Orders Placed This Encounter   Medications    azelastine (ASTELIN) 0.1 % nasal spray     Si sprays each nostril 1-2x daily as needed for nasal congestion (use nightly for first 2 week)     Dispense:  30 mL     Refill:  11      Return visit 3 months for CT  review.    Trial of astelin spray  Record request from [facility]  Audiogram next visit.  May benefit from ear tube placement in right ear

## 2024-07-10 NOTE — LETTER
7/10/2024      Vinny Hernandez  [address] [address] [address]      Dear Colleague,    Thank you for referring your patient, Vinny Hernandez, to the [facility]. Please see a copy of my visit note below.    CHIEF COMPLAINT: Patient presents with:  Consult: Deviated Septum repair 2yrs ago, Chronic Ear pressure/fullness, occasional dull throbbing pain in both ears,  Facial pain and pressure, SNOT Total 47         HISTORY OF PRESENT ILLNESS    Dilip was seen at the behest of Li Poe CNP for sinus congestion. He has a history of septoplasty at [facility].  He complains of right sided hearing loss.     Sino-Nasal Outcome Test (SNOT - 22)    1. Need to Blow Nose: (P) Moderate  2. Nasal Blockage: (P) Moderate  3. Sneezing: (P) Mild or slight  4. Runny Nose: (P) Moderate  5. Cough: (P) Very mild  6. Post-nasal discharge: (P) Severe  7. Thick nasal discharge: (P) Moderate  8. Ear fullness: (P) Severe  9. Dizziness: (P) Mild or slight  10. Ear Pain: (P) Moderate  11. Facial pain/pressure: (P) Moderate  12. Decreased Sense of Smell/Taste: (P) None  13. Difficulty falling asleep: (P) Mild or slight  14. Wake up at night: (P) Mild or slight  15. Lack of a good night's sleep: (P) Mild or slight  16. Wake up tired: (P) Moderate  17. Fatigue: (P) Mild or slight  18. Reduced Productivity: (P) Mild or slight  19. Reduced Concentration: (P) Mild or slight  20. Frustrated/restless/irritable: (P) Very mild  21. Sad: (P) None  22. Embarrassed: (P) None    Total Score: (P) 47    COPYRIGHT 1996. [facility] [place]      REVIEW OF SYSTEMS    Review of Systems as per HPI and PMHx, otherwise 10 system review system are negative.       ALLERGIES    Patient has no known allergies.    CURRENT MEDICATIONS      Current Outpatient Medications:      azelastine (ASTELIN) 0.1 % nasal spray, 2 sprays each nostril 1-2x daily as needed for nasal congestion (use nightly for first 2 week),  Disp: 30 mL, Rfl: 11     PAST MEDICAL HISTORY    PAST MEDICAL HISTORY: No past medical history on file.    PAST SURGICAL HISTORY    PAST SURGICAL HISTORY:   Past Surgical History:   Procedure Laterality Date     HAND SURGERY       HERNIA REPAIR       KNEE SURGERY       LARYNGOSCOPY N/A 12/12/2014    Procedure: DIRECT LARYNGOSCOPY AND BIOPSY LEFT VOCAL CORD;  Surgeon: Ernesto Galvez MD;  Location: [facility];  Service:      OTHER SURGICAL HISTORY      knee orthoscopic (right)     TONSILLECTOMY       TONSILLECTOMY         FAMILY  HISTORY    FAMILY HISTORY:   Family History   Problem Relation Age of Onset     Colon Cancer Father        SOCIAL HISTORY    SOCIAL HISTORY:   Social History     Tobacco Use     Smoking status: Never     Passive exposure: Never     Smokeless tobacco: Never   Substance Use Topics     Alcohol use: Yes     Alcohol/week: 10.0 standard drinks of alcohol     Types: 10 Standard drinks or equivalent per week        PHYSICAL EXAM    HEAD: Normal appearance and symmetry:  No cutaneous lesions.      NECK:  supple     EARS:    Right:   inducopexy with retracted TM   LEFT:   TM atelectatic, retracted    EYES:  EOMI    CN VII/XII:  intact     NOSE:     Dorsum:   straight  Septum:  midline  Mucosa:  moist        ORAL CAVITY/OROPHARYNX:     Lips:  Normal.  Tongue: normal, midline  Mucosa:   no lesions     NECK:  Trachea:  midline.              Thyroid:  normal              Adenopathy:  none        NEURO:   Alert and Oriented     GAIT AND STATION:  normal     RESPIRATORY:   Symmetry and Respiratory effort     PSYCH:  Normal mood and affect     SKIN:   warm and dry         IMPRESSION:    Encounter Diagnoses   Name Primary?     Chronic congestion of paranasal sinus      Hearing difficulty of right ear Yes     Adhesions of drum head to incus, right           RECOMMENDATIONS:      Orders Placed This Encounter   Procedures     CT Sinus w/o Contrast     Adult Audiology  Referral      Orders  Placed This Encounter   Medications     azelastine (ASTELIN) 0.1 % nasal spray     Si sprays each nostril 1-2x daily as needed for nasal congestion (use nightly for first 2 week)     Dispense:  30 mL     Refill:  11      Return visit 3 months for CT review.    Trial of astelin spray  Record request from [facility]  Audiogram next visit.  May benefit from ear tube placement in right ear      Again, thank you for allowing me to participate in the care of your patient.        Sincerely,        Rocael Gar MD

## 2024-07-10 NOTE — NURSING NOTE
Sino-Nasal Outcome Test (SNOT - 22)    1. Need to Blow Nose: (P) Moderate  2. Nasal Blockage: (P) Moderate  3. Sneezing: (P) Mild or slight  4. Runny Nose: (P) Moderate  5. Cough: (P) Very mild  6. Post-nasal discharge: (P) Severe  7. Thick nasal discharge: (P) Moderate  8. Ear fullness: (P) Severe  9. Dizziness: (P) Mild or slight  10. Ear Pain: (P) Moderate  11. Facial pain/pressure: (P) Moderate  12. Decreased Sense of Smell/Taste: (P) None  13. Difficulty falling asleep: (P) Mild or slight  14. Wake up at night: (P) Mild or slight  15. Lack of a good night's sleep: (P) Mild or slight  16. Wake up tired: (P) Moderate  17. Fatigue: (P) Mild or slight  18. Reduced Productivity: (P) Mild or slight  19. Reduced Concentration: (P) Mild or slight  20. Frustrated/restless/irritable: (P) Very mild  21. Sad: (P) None  22. Embarrassed: (P) None    Total Score: (P) 47    COPYRIGHT 1996. [facility] [place]     Janet Yung CMA

## 2024-07-23 ENCOUNTER — HOSPITAL ENCOUNTER (OUTPATIENT)
Dept: CT IMAGING | Facility: HOSPITAL | Age: 44
Discharge: HOME | End: 2024-07-23
Attending: OTOLARYNGOLOGY | Admitting: OTOLARYNGOLOGY
Payer: COMMERCIAL

## 2024-07-23 DIAGNOSIS — J32.9 CHRONIC CONGESTION OF PARANASAL SINUS: ICD-10-CM

## 2024-07-23 PROCEDURE — 70486 CT MAXILLOFACIAL W/O DYE: CPT

## 2024-07-24 DIAGNOSIS — J32.9 CHRONIC CONGESTION OF PARANASAL SINUS: Primary | ICD-10-CM

## 2024-07-24 RX ORDER — PREDNISONE 10 MG/1
10 TABLET ORAL 2 TIMES DAILY
Qty: 20 TABLET | Refills: 0 | Status: SHIPPED | OUTPATIENT
Start: 2024-07-24 | End: 2024-08-03

## 2024-09-13 ENCOUNTER — E-VISIT (OUTPATIENT)
Dept: FAMILY MEDICINE | Facility: CLINIC | Age: 44
End: 2024-09-13
Payer: COMMERCIAL

## 2024-09-13 DIAGNOSIS — R51.9 ACUTE NONINTRACTABLE HEADACHE, UNSPECIFIED HEADACHE TYPE: Primary | ICD-10-CM

## 2024-09-13 PROCEDURE — 99421 OL DIG E/M SVC 5-10 MIN: CPT | Performed by: NURSE PRACTITIONER

## 2024-09-13 NOTE — PATIENT INSTRUCTIONS
Thank you for choosing us for your care. I think an in-clinic or virtual visit would be the best next step based on your symptoms. Please schedule a clinic appointment; you won t be charged for this eVisit.      You can schedule an appointment by clicking here in [facility], or call [phone].

## 2024-09-16 ENCOUNTER — OFFICE VISIT (OUTPATIENT)
Dept: FAMILY MEDICINE | Facility: CLINIC | Age: 44
End: 2024-09-16
Payer: COMMERCIAL

## 2024-09-16 VITALS
HEIGHT: 70 IN | HEART RATE: 85 BPM | TEMPERATURE: 97.5 F | BODY MASS INDEX: 24.14 KG/M2 | OXYGEN SATURATION: 97 % | WEIGHT: 168.6 LBS | SYSTOLIC BLOOD PRESSURE: 114 MMHG | DIASTOLIC BLOOD PRESSURE: 72 MMHG | RESPIRATION RATE: 18 BRPM

## 2024-09-16 DIAGNOSIS — J01.90 ACUTE BACTERIAL SINUSITIS: Primary | ICD-10-CM

## 2024-09-16 DIAGNOSIS — B96.89 ACUTE BACTERIAL SINUSITIS: Primary | ICD-10-CM

## 2024-09-16 PROCEDURE — 99213 OFFICE O/P EST LOW 20 MIN: CPT

## 2024-09-16 ASSESSMENT — PAIN SCALES - GENERAL: PAINLEVEL: MILD PAIN (2)

## 2024-09-16 NOTE — PATIENT INSTRUCTIONS

## 2024-09-16 NOTE — PROGRESS NOTES
"  Assessment & Plan     Acute bacterial sinusitis  HPI and symptoms most consistent with acute bacterial sinusitis.  Will treat with Augmentin twice daily for 7 days.  Take antibiotics until completion.  May take probiotics to prevent diarrhea.  Continue with nasal flushes.  May take ibuprofen or Tylenol for pain.  If symptoms do not resolve with antibiotic completion, please follow-up.  - amoxicillin-clavulanate (AUGMENTIN) 875-125 MG tablet; Take 1 tablet by mouth 2 times daily for 7 days.    Subjective   Dilip is a 44 year old, presenting for the following health issues:  Sinus Problem (Sinus pain for the last month. Patient states the pain is constant. Has been taking ibuprofen for the pain which helps sometimes. )      9/16/2024    10:14 AM   Additional Questions   Roomed by Yakelin WELLINGTON CMA     Sinus Problem     History of Present Illness       Headaches:   Since the patient's last clinic visit, headaches are: worsened  The patient is getting headaches:  Everyday  He is able to do normal daily activities when he has a migraine.  The patient is taking the following rescue/relief medications:  Ibuprofen (Advil, Motrin)   Patient states \"I get some relief\" from the rescue/relief medications.   The patient is taking the following medications to prevent migraines:  No medications to prevent migraines  In the past 4 weeks, the patient has gone to an Urgent Care or Emergency Room 0 times times due to headaches.   He is taking medications regularly.     Patient is a 44-year-old male who presents for concerns of sinus pressure.  Medical history includes S/P septoplasty two years ago and allergic rhinitis.  Reports frontal and maxillary sinus pressure for approximately 1 month.  Sinus pain has been getting worse, described as more stabbing pain.  Will flush out nose at night and will expel thick green mucus.  Does have some ear pressure/pain which is related to chronic negative ear pressure, will be getting ear tubes with " "ENT soon.  Did experienced dyspnea with exertion couple weeks ago which has resolved.  No fever or chills.  No vision issues.  No teeth pain.  Denies headache.  Denies respiratory symptoms of cough or difficulty breathing.      Review of Systems  Review of systems negative otherwise known HPI.    No past medical history on file.    Past Surgical History:   Procedure Laterality Date    HAND SURGERY      HERNIA REPAIR      KNEE SURGERY      LARYNGOSCOPY N/A 12/12/2014    Procedure: DIRECT LARYNGOSCOPY AND BIOPSY LEFT VOCAL CORD;  Surgeon: Ernesto Galvez MD;  Location: [facility];  Service:     OTHER SURGICAL HISTORY      knee orthoscopic (right)    TONSILLECTOMY      TONSILLECTOMY       Family History   Problem Relation Age of Onset    Colon Cancer Father      Social History     Tobacco Use    Smoking status: Never     Passive exposure: Never    Smokeless tobacco: Never   Substance Use Topics    Alcohol use: Yes     Alcohol/week: 10.0 standard drinks of alcohol     Types: 10 Standard drinks or equivalent per week     Current Outpatient Medications   Medication Sig Dispense Refill    amoxicillin-clavulanate (AUGMENTIN) 875-125 MG tablet Take 1 tablet by mouth 2 times daily for 7 days. 14 tablet 0    azelastine (ASTELIN) 0.1 % nasal spray 2 sprays each nostril 1-2x daily as needed for nasal congestion (use nightly for first 2 week) 30 mL 11     No current facility-administered medications for this visit.       Objective    /72 (BP Location: Right arm, Patient Position: Sitting, Cuff Size: Adult Regular)   Pulse 85   Temp 97.5  F (36.4  C) (Temporal)   Resp 18   Ht 1.778 m (5' 10\")   Wt 76.5 kg (168 lb 9.6 oz)   SpO2 97%   BMI 24.19 kg/m    Body mass index is 24.19 kg/m .  Physical Exam   General: Alert, oriented, no acute distress.    Head: Normocephalic and atraumatic.   Eyes: Conjunctiva and sclera clear. TEDDY.   Ears: External ear nontender. TMs intact, scarring of TMs. Grossly normal " hearing.   Nose: Deviated nose.  No mucosal edema.  Oropharynx: Dentition intact. Oral and posterior pharynx pink and moist.     Face: Mild frontal and maxillary tenderness.  Neck: Supple, no masses or nodes. No adenopathy.    Respiratory: Lungs clear, unlabored. No rales, rhonchi, or wheezes.    Cardiovascular: Regular rate and rhythm, S1 and S2. No murmurs.    Skin: No suspicious lesions, rashes, or abnormalities.    Neurologic: No gross motor or sensory deficit. Mentation intact and speech normal.       Signed Electronically by: CHERRI Girard CNP

## 2024-10-02 NOTE — PROGRESS NOTES
Assessment & Plan     Hearing loss mostly SN and likely sound exposure.     Start ipratropium and budesonide rinses.    Trial home TMJ exercises as per AVS to see if this helps with episodic ear pain.      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, bilateral                    23 minutes spent on the date of the encounter doing chart review, history and exam, documentation and further activities per the note  {     Jignesh [facility]    Subjective     HPI-     3M follow-up from Dr Gar:    RECOMMENDATIONS:         Orders Placed This Encounter   Procedures    CT Sinus w/o Contrast    Adult Audiology  Referral           Orders Placed This Encounter   Medications    azelastine (ASTELIN) 0.1 % nasal spray       Si sprays each nostril 1-2x daily as needed for nasal congestion (use nightly for first 2 week)       Dispense:  30 mL       Refill:  11      Return visit 3 months for CT review.    Trial of astelin spray  Record request from [facility]  Audiogram next visit.  May benefit from ear tube placement in right ear    Sinus CT:  IMPRESSION:  1.  Postoperative changes of functional endoscopic sinus surgery without complication.  2.  Mild to moderate mucosal thickening scattered about the paranasal sinuses. Dependent aerated secretions in the right maxillary sinus is nonspecific but can be seen in setting of acute  "sinusitis  3.  Occlusion of the bilateral frontal sinus drainage pathway. Occlusion of the bilateral sphenoid ostia    He was treated with prednisone    Audio today:  Pt reports ear pressure/ fullness, and occasional otalgia in both ears. Tried a nasal spray, which he states did not help with fullness in the ears. Completed head CT 7/ 23/ 24. Longstanding tinnitus in both ears, and believes he has hearing loss in both ears. Had tubes in childhood. Denied vertigo, but describes a, \" daze feeling. 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 Denied otorrhea, and family hx of hearing loss. RESULTS: Otoscopy: Clear canals bilat. Tymps: Normal eardrum mobility bilat. Reflexes: Present for right ipsi, absent for left ipsi. Unable to seal for contra. Audio: Right: Normal hearing from 250- 2000 Hz, sloping to mild to moderately severe mixed hearing loss; Left: Normal hearing range sloping to moderate to severe mixed hearing loss. Negative Lynda.       Denies clenching or grinding.  Has constant chronic rhinitis.  Gets worse with exercising.  He only did a 3 week trial of astelin and it tastes bad.  Was placed on ABX for acute sinusitis 3 weeks ago which helped with the sharp facial pain he was having but not the chornic ones. He rarely needs ABXs for sinus issues.      Review of Systems   ENT as above      Objective    There were no vitals taken for this visit.    Physical Exam     Ears - The tympanic membranes are normal in appearance, bony landmarks are intact with adhesion of drum head to incus.  .  No  , perforation, or masses.   No fluid or purulence was seen in the external canal or the middle ear. No evidence of infection of the middle ear or external canal, cerumen was normal in appearance.    Jaw- + crepitus b/l w/o TTP       "

## 2024-10-07 NOTE — PROGRESS NOTES
AUDIOLOGY REPORT    SUMMARY: Audiology visit completed. See audiogram for results.      RECOMMENDATIONS: Follow-up with ENT.    Madeline Rodriguez, CCC-A  [place] Licensed Audiologist #[id number]

## 2024-10-10 ENCOUNTER — OFFICE VISIT (OUTPATIENT)
Dept: AUDIOLOGY | Facility: CLINIC | Age: 44
End: 2024-10-10
Attending: OTOLARYNGOLOGY
Payer: COMMERCIAL

## 2024-10-10 ENCOUNTER — OFFICE VISIT (OUTPATIENT)
Dept: OTOLARYNGOLOGY | Facility: CLINIC | Age: 44
End: 2024-10-10
Attending: OTOLARYNGOLOGY
Payer: COMMERCIAL

## 2024-10-10 DIAGNOSIS — H93.13 TINNITUS OF BOTH EARS: ICD-10-CM

## 2024-10-10 DIAGNOSIS — H91.91 HEARING DIFFICULTY OF RIGHT EAR: ICD-10-CM

## 2024-10-10 DIAGNOSIS — H90.6 MIXED CONDUCTIVE AND SENSORINEURAL HEARING LOSS OF BOTH EARS: ICD-10-CM

## 2024-10-10 DIAGNOSIS — J31.0 CHRONIC RHINITIS: Primary | ICD-10-CM

## 2024-10-10 DIAGNOSIS — H74.13: ICD-10-CM

## 2024-10-10 DIAGNOSIS — H92.03 OTALGIA, BILATERAL: ICD-10-CM

## 2024-10-10 DIAGNOSIS — J32.4 CHRONIC PANSINUSITIS: ICD-10-CM

## 2024-10-10 DIAGNOSIS — M26.609 TMJ (TEMPOROMANDIBULAR JOINT SYNDROME): ICD-10-CM

## 2024-10-10 DIAGNOSIS — H90.6 MIXED CONDUCTIVE AND SENSORINEURAL HEARING LOSS OF BOTH EARS: Primary | ICD-10-CM

## 2024-10-10 PROCEDURE — 92557 COMPREHENSIVE HEARING TEST: CPT | Performed by: AUDIOLOGIST

## 2024-10-10 PROCEDURE — 92565 STENGER TEST PURE TONE: CPT | Performed by: AUDIOLOGIST

## 2024-10-10 PROCEDURE — 99214 OFFICE O/P EST MOD 30 MIN: CPT | Performed by: PHYSICIAN ASSISTANT

## 2024-10-10 PROCEDURE — 92550 TYMPANOMETRY & REFLEX THRESH: CPT | Mod: 52 | Performed by: AUDIOLOGIST

## 2024-10-10 RX ORDER — BUDESONIDE 0.5 MG/2ML
INHALANT ORAL
Qty: 360 ML | Refills: 3 | Status: SHIPPED | OUTPATIENT
Start: 2024-10-10

## 2024-10-10 RX ORDER — IPRATROPIUM BROMIDE 21 UG/1
2 SPRAY, METERED NASAL DAILY
Qty: 30 ML | Refills: 5 | Status: SHIPPED | OUTPATIENT
Start: 2024-10-10

## 2024-10-10 NOTE — PATIENT INSTRUCTIONS
Temporomandibular Disorder: Care Instructions  Overview     Temporomandibular disorders (TMDs) are problems with the muscles and joints that connect your jaw to your skull. These problems cause pain when you talk, chew, swallow, or yawn. You may feel this pain on one or both sides.  TMDs are often caused by tight jaw muscles. The tightness can be caused by clenching or grinding your teeth.  Lowering stress may help relax your jaw and reduce your pain. Your doctor may suggest a dental splint. Splints can help protect the teeth from grinding and clenching.  You may be able to do some things at home to feel better. If that doesn't work for you, your doctor may prescribe medicine to help relax your muscles and control the pain.  Follow-up care is a key part of your treatment and safety. Be sure to make and go to all appointments, and call your doctor if you are having problems. It's also a good idea to know your test results and keep a list of the medicines you take.  How can you care for yourself at home?  Put an ice pack or a warm, moist cloth on your jaw for 15 minutes. Do this several times a day. Try switching back and forth between moist heat and cold.  Search for [facility] Youtube videos on TMJ exercises you can do at home.    Ask your doctor if you can take an over-the-counter pain medicine, such as acetaminophen (Tylenol), ibuprofen (Advil, Motrin), or naproxen (Aleve). Be safe with medicines. Read and follow all instructions on the label.  Choose softer foods, such as eggs, yogurt, soup, or pureed foods. Try to avoid hard foods. Cut food into small pieces.  If it doesn't cause pain, practice relaxing your jaw. Gently open and close your mouth. Move your jaw straight up and down. Do this for a few minutes every morning and evening. Watching yourself in a mirror can help.  Have good posture. Try to line up your ears, shoulders, and hips when sitting and standing.  Learn to manage your stress.  "Try:  Relaxation techniques. These may include taking slow, deep breaths, and mindful meditation. It may include progressive muscle relaxation, yoga, mack chi, and qi gong.  Getting at least 30 minutes of exercise on most days to relieve stress. Try walking.  Try not to:  Hold a phone between your shoulder and your jaw.  Open your mouth all the way, like when you sing loudly or yawn.  Clench or grind your teeth, bite your lips, or chew your fingernails.  Clench things between your teeth, such as pens, pipes, or cigars.  When should you call for help?   Call your doctor now or seek immediate medical care if:    Your jaw is locked open or shut or it is hard to move your jaw.   Watch closely for changes in your health, and be sure to contact your doctor if:    Your jaw pain gets worse.     Your face is swollen.     You do not get better as expected.   Where can you learn more?  Go to https://www.[employer].net/patiented  Enter P868 in the search box to learn more about \"Temporomandibular Disorder: Care Instructions.\"  Current as of: August 6, 2023               Content Version: 14.0    8379-3591 [employer].   Care instructions adapted under license by your healthcare professional. If you have questions about a medical condition or this instruction, always ask your healthcare professional. [employer] disclaims any warranty or liability for your use of this information.   "

## 2024-10-10 NOTE — LETTER
10/10/2024      Vinny Hernandez  [id number] [address] [address]      Dear Colleague,    Thank you for referring your patient, Vinny Hernandez, to the [facility]. Please see a copy of my visit note below.    Assessment & Plan    Hearing loss mostly SN and likely sound exposure.     Start ipratropium and budesonide rinses.    Trial home TMJ exercises as per AVS to see if this helps with episodic ear pain.      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, bilateral                    23 minutes spent on the date of the encounter doing chart review, history and exam, documentation and further activities per the note  {     SOURAV Tanner  [facility]    Subjective     HPI-     3M follow-up from Dr Gar:    RECOMMENDATIONS:         Orders Placed This Encounter   Procedures     CT Sinus w/o Contrast     Adult Audiology  Referral           Orders Placed This Encounter   Medications     azelastine (ASTELIN) 0.1 % nasal spray       Si sprays each nostril 1-2x daily as needed for nasal congestion (use nightly for first 2 week)       Dispense:  30 mL       Refill:  11      Return visit 3 months for CT review.    Trial of astelin spray  Record request from [facility]  Audiogram next visit.  May benefit from ear tube placement in right ear    Sinus CT:  IMPRESSION:  1.  Postoperative changes of  "functional endoscopic sinus surgery without complication.  2.  Mild to moderate mucosal thickening scattered about the paranasal sinuses. Dependent aerated secretions in the right maxillary sinus is nonspecific but can be seen in setting of acute sinusitis  3.  Occlusion of the bilateral frontal sinus drainage pathway. Occlusion of the bilateral sphenoid ostia    He was treated with prednisone    Audio today:  Pt reports ear pressure/ fullness, and occasional otalgia in both ears. Tried a nasal spray, which he states did not help with fullness in the ears. Completed head CT 7/ 23/ 24. Longstanding tinnitus in both ears, and believes he has hearing loss in both ears. Had tubes in childhood. Denied vertigo, but describes a, \" daze feeling. 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 Denied otorrhea, and family hx of hearing loss. RESULTS: Otoscopy: Clear canals bilat. Tymps: Normal eardrum mobility bilat. Reflexes: Present for right ipsi, absent for left ipsi. Unable to seal for contra. Audio: Right: Normal hearing from 250- 2000 Hz, sloping to mild to moderately severe mixed hearing loss; Left: Normal hearing range sloping to moderate to severe mixed hearing loss. Negative Lynda.       Denies clenching or grinding.  Has constant chronic rhinitis.  Gets worse with exercising.  He only did a 3 week trial of astelin and it tastes bad.  Was placed on ABX for acute sinusitis 3 weeks ago which helped with the sharp facial pain he was having but not the chornic ones. He rarely needs ABXs for sinus issues.      Review of Systems   ENT as above      Objective    There were no vitals taken for this visit.    Physical Exam     Ears - The tympanic membranes are normal in appearance, bony landmarks are intact with adhesion of drum head to incus.  .  No  , perforation, or masses.   No fluid or purulence was seen in the external canal or the middle ear. No " evidence of infection of the middle ear or external canal, cerumen was normal in appearance.    Jaw- + crepitus b/l w/o TTP           Again, thank you for allowing me to participate in the care of your patient.        Sincerely,        SOURAV Tanner

## 2024-12-04 ENCOUNTER — OFFICE VISIT (OUTPATIENT)
Dept: FAMILY MEDICINE | Facility: CLINIC | Age: 44
End: 2024-12-04
Payer: COMMERCIAL

## 2024-12-04 VITALS
WEIGHT: 171.1 LBS | TEMPERATURE: 98.9 F | BODY MASS INDEX: 24.5 KG/M2 | DIASTOLIC BLOOD PRESSURE: 81 MMHG | OXYGEN SATURATION: 97 % | HEIGHT: 70 IN | SYSTOLIC BLOOD PRESSURE: 121 MMHG | HEART RATE: 66 BPM | RESPIRATION RATE: 12 BRPM

## 2024-12-04 DIAGNOSIS — K29.00 OTHER ACUTE GASTRITIS WITHOUT HEMORRHAGE: Primary | ICD-10-CM

## 2024-12-04 DIAGNOSIS — R10.9 LEFT FLANK PAIN: ICD-10-CM

## 2024-12-04 DIAGNOSIS — K59.00 CONSTIPATION, UNSPECIFIED CONSTIPATION TYPE: ICD-10-CM

## 2024-12-04 LAB
ALBUMIN UR-MCNC: NEGATIVE MG/DL
AMORPH CRY #/AREA URNS HPF: ABNORMAL /HPF
APPEARANCE UR: ABNORMAL
BACTERIA #/AREA URNS HPF: ABNORMAL /HPF
BILIRUB UR QL STRIP: NEGATIVE
COLOR UR AUTO: YELLOW
GLUCOSE UR STRIP-MCNC: NEGATIVE MG/DL
HGB UR QL STRIP: NEGATIVE
KETONES UR STRIP-MCNC: NEGATIVE MG/DL
LEUKOCYTE ESTERASE UR QL STRIP: NEGATIVE
NITRATE UR QL: NEGATIVE
PH UR STRIP: 7 [PH] (ref 5–8)
RBC #/AREA URNS AUTO: ABNORMAL /HPF
SP GR UR STRIP: 1.02 (ref 1–1.03)
UROBILINOGEN UR STRIP-ACNC: 1 E.U./DL
WBC #/AREA URNS AUTO: ABNORMAL /HPF

## 2024-12-04 PROCEDURE — 81001 URINALYSIS AUTO W/SCOPE: CPT | Performed by: FAMILY MEDICINE

## 2024-12-04 PROCEDURE — 99214 OFFICE O/P EST MOD 30 MIN: CPT | Performed by: FAMILY MEDICINE

## 2024-12-04 RX ORDER — DOCUSATE SODIUM 100 MG/1
100 CAPSULE, LIQUID FILLED ORAL 2 TIMES DAILY
Qty: 40 CAPSULE | Refills: 0 | Status: SHIPPED | OUTPATIENT
Start: 2024-12-04

## 2024-12-04 RX ORDER — POLYETHYLENE GLYCOL 3350 17 G/17G
1 POWDER, FOR SOLUTION ORAL DAILY
Qty: 510 G | Refills: 0 | Status: SHIPPED | OUTPATIENT
Start: 2024-12-04

## 2024-12-04 ASSESSMENT — ENCOUNTER SYMPTOMS
ABDOMINAL PAIN: 1
BACK PAIN: 1

## 2024-12-04 NOTE — ASSESSMENT & PLAN NOTE
Negative UA for blood.    Orders:    UA Macroscopic with reflex to Microscopic and Culture - Lab Collect; Future    UA Macroscopic with reflex to Microscopic and Culture - Lab Collect    UA Microscopic with Reflex to Culture

## 2024-12-04 NOTE — PROGRESS NOTES
Assessment & Plan   Assessment & Plan  Left flank pain  Negative UA for blood.    Orders:    UA Macroscopic with reflex to Microscopic and Culture - Lab Collect; Future    UA Macroscopic with reflex to Microscopic and Culture - Lab Collect    UA Microscopic with Reflex to Culture    Other acute gastritis without hemorrhage  Given physical exam constipation will moved to 2 weeks straight of PPI therapy.  If not improved in the next 5 to 7 days, contact clinic and we will discuss next steps which will include getting a CT scan of the abdomen.  Orders:    omeprazole (PRILOSEC) 20 MG DR capsule; Take 1 capsule (20 mg) by mouth daily.    Constipation, unspecified constipation type  Discussed options including using MiraLAX and Colace to help regulate stools.  Also possible over-the-counter entities that can be used to speed up the bowel movements.  If not improved then will get CT scan for further evaluation.  Orders:    docusate sodium (COLACE) 100 MG capsule; Take 1 capsule (100 mg) by mouth 2 times daily.    polyethylene glycol (MIRALAX) 17 GM/Dose powder; Take 17 g (1 Capful) by mouth daily.           See Patient Instructions      Da Cherry is a 44 year old, presenting for the following health issues:  Back Pain (Lower/Mid-Back Pain on Left Side x2 weeks)        12/4/2024     1:48 PM   Additional Questions   Roomed by MIRIAN Vitor   Accompanied by Self         12/4/2024     1:48 PM   Patient Reported Additional Medications   Patient reports taking the following new medications N/A     Left flank pain going on for about 2 weeks.  Came on slowly over a day but then has been constant.  Worse with laying flat.  Better when standing upright.  Past couple of days actually getting radiating pain around to the front in the stomach.  No vomiting but his reflux has been elevated and more frequent.  He does not recall having any abnormal bowel movements.  No changes in diet.  He is worried for possible kidney stone.  No  "change in workout routines.  He did try a muscle massager and it gave no benefit.  Also has had more gas recently    Back Pain   This is a new problem. The current episode started more than 1 week ago. The problem occurs constantly. The problem has been gradually worsening. The pain is associated with no known injury. The pain is present in the lumbar spine. The quality of the pain is described as stabbing. The pain does not radiate. The pain is at a severity of 5/10. The pain is moderate. The symptoms are aggravated by twisting and certain positions. The pain is The same all the time. Associated symptoms include abdominal pain. He has tried nothing for the symptoms.          Objective    /81 (BP Location: Left arm, Patient Position: Sitting, Cuff Size: Adult Large)   Pulse 66   Temp 98.9  F (37.2  C) (Oral)   Resp 12   Ht 1.778 m (5' 10\")   Wt 77.6 kg (171 lb 1.6 oz)   SpO2 97%   BMI 24.55 kg/m    Body mass index is 24.55 kg/m .  Physical Exam  Vitals and nursing note reviewed.   Constitutional:       General: He is not in acute distress.     Appearance: Normal appearance. He is not ill-appearing.   HENT:      Head: Normocephalic and atraumatic.   Eyes:      Extraocular Movements: Extraocular movements intact.      Conjunctiva/sclera: Conjunctivae normal.   Cardiovascular:      Rate and Rhythm: Normal rate and regular rhythm.      Pulses: Normal pulses.      Heart sounds: Normal heart sounds.   Pulmonary:      Effort: Pulmonary effort is normal.      Breath sounds: Normal breath sounds.   Abdominal:      General: Abdomen is flat. Bowel sounds are normal.      Tenderness: There is no right CVA tenderness or left CVA tenderness.      Comments: Tender to palpation along the left upper and lower quadrant as well as the epigastrium.  Mild guarding at the epigastrium.  Negative rebound.  Dull to percussion down left side as well as hypertympanic across the upper abdomen.   Musculoskeletal:      Right lower " leg: No edema.      Left lower leg: No edema.   Skin:     Capillary Refill: Capillary refill takes less than 2 seconds.   Neurological:      Mental Status: He is alert and oriented to person, place, and time.   Psychiatric:         Attention and Perception: Attention normal.         Mood and Affect: Mood normal.         Speech: Speech normal.         Thought Content: Thought content normal.        Results for orders placed or performed in visit on 12/04/24 (from the past 24 hours)   UA Macroscopic with reflex to Microscopic and Culture - Lab Collect    Specimen: Urine, Clean Catch   Result Value Ref Range    Color Urine Yellow Colorless, Straw, Light Yellow, Yellow    Appearance Urine Cloudy (A) Clear    Glucose Urine Negative Negative mg/dL    Bilirubin Urine Negative Negative    Ketones Urine Negative Negative mg/dL    Specific Gravity Urine 1.020 1.005 - 1.030    Blood Urine Negative Negative    pH Urine 7.0 5.0 - 8.0    Protein Albumin Urine Negative Negative mg/dL    Urobilinogen Urine 1.0 0.2, 1.0 E.U./dL    Nitrite Urine Negative Negative    Leukocyte Esterase Urine Negative Negative   UA Microscopic with Reflex to Culture   Result Value Ref Range    Bacteria Urine Few (A) None Seen /HPF    RBC Urine None Seen 0-2 /HPF /HPF    WBC Urine None Seen 0-5 /HPF /HPF    Amorphous Crystals Urine Many (A) None Seen /HPF    Narrative    Urine Culture not indicated           Signed Electronically by: Jignesh Mathew,

## 2024-12-05 NOTE — PROGRESS NOTES
Assessment & Plan     Patient medically cleared for hearing aids.    Persistent rhinitis, possibly persistent sinusitis.  Will trial gent/budesonide rinses for a month.  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 chronic pansinusitis, TMJ, adhesion of drum head to incus bilateral. possible ear tube placement SNOT = 47     Last Visit 10/10:  Assessment & Plan  Hearing loss mostly SN and likely sound exposure.      Start ipratropium and budesonide rinses.     Trial home TMJ exercises as per AVS to see if this helps with episodic ear pain.       End of December f/up     Interim Hx:    Sino-Nasal Outcome Test (SNOT - 22)    1. Need to Blow Nose: (Proxy-Rptd) (P) Severe  2. Nasal Blockage: (Proxy-Rptd) (P) Severe  3. Sneezing: (Proxy-Rptd) (P) Moderate  4. Runny Nose: (Proxy-Rptd) (P) Moderate  5. Cough: (Proxy-Rptd) (P) Very mild  6. Post-nasal discharge: (Proxy-Rptd) (P) Severe  7. Thick nasal discharge: (Proxy-Rptd) (P) Severe  8. Ear fullness: (Proxy-Rptd) (P) Severe  9. Dizziness: (Proxy-Rptd) (P) Very mild  10. Ear Pain: (Proxy-Rptd) (P) Mild or slight     12. Decreased Sense of Smell/Taste:  (Proxy-Rptd) (P) Very mild  13. Difficulty falling asleep: (Proxy-Rptd) (P) Mild or slight  14. Wake up at night: (Proxy-Rptd) (P) Mild or slight  15. Lack of a good night's sleep: (Proxy-Rptd) (P) Mild or slight  16. Wake up tired: (Proxy-Rptd) (P) Mild or slight  17. Fatigue: (Proxy-Rptd) (P) Very mild  18. Reduced Productivity: (Proxy-Rptd) (P) Very mild  19. Reduced Concentration: (Proxy-Rptd) (P) Very mild  20. Frustrated/restless/irritable: (Proxy-Rptd) (P) Very mild  21. Sad: (Proxy-Rptd) (P) None  22. Embarrassed: (Proxy-Rptd) (P) None         COPYRIGHT 1996. [facility] [place], [place]    Today, patient reports little changes.  Used the ipratropium BID and it didn't help much over once a day.   Ipratropium is 70$ a month, he had tried doubling the dose without much benefit.  Had been using budesonide.  Reports hx septoplasty , bilateral ethmoids, maxillary antrostomies and reduces turbs.   Has been tested for allergies and they found nothing.   Prednisone didn't help the rhinitis but did help  his body aches.  Was placed on amox in September for 7 days which also didn't help  .    Ears still feel plugged.  Is willing to consider hearing aids.      Review of Systems   ENT as above      Objective    There were no vitals taken for this visit.    Physical Exam     Gen- appears well

## 2024-12-09 ENCOUNTER — TELEPHONE (OUTPATIENT)
Dept: SCHEDULING | Facility: CLINIC | Age: 44
End: 2024-12-09

## 2024-12-09 NOTE — TELEPHONE ENCOUNTER
Reason for Call:  Appointment Request    Patient requesting this type of appt: Chronic Diease Management/Medication/Follow-Up    Requested provider:  Jignesh Mathew    Reason patient unable to be scheduled: Not within requested timeframe    When does patient want to be seen/preferred time: 1-2 days    Comments: Requesting follow up appointment from visit on 12/4.    Could we send this information to you in [facility] or would you prefer to receive a phone call?:   Patient would prefer a phone call   Okay to leave a detailed message?: Yes at Cell number on file:    Telephone Information:   Mobile [phone]       Call taken on 12/9/2024 at 10:23 AM by Annabella Feliz

## 2024-12-09 NOTE — TELEPHONE ENCOUNTER
Spoke with patient - patient states symptoms are not improving from 12/04/2024 appointment. Patient has scheduled with [facility] 12/10/2024 for follow up

## 2024-12-10 ENCOUNTER — OFFICE VISIT (OUTPATIENT)
Dept: FAMILY MEDICINE | Facility: CLINIC | Age: 44
End: 2024-12-10
Payer: COMMERCIAL

## 2024-12-10 VITALS
SYSTOLIC BLOOD PRESSURE: 125 MMHG | BODY MASS INDEX: 24.47 KG/M2 | HEIGHT: 70 IN | WEIGHT: 170.9 LBS | OXYGEN SATURATION: 98 % | TEMPERATURE: 98 F | HEART RATE: 75 BPM | RESPIRATION RATE: 12 BRPM | DIASTOLIC BLOOD PRESSURE: 81 MMHG

## 2024-12-10 DIAGNOSIS — R10.84 ABDOMINAL PAIN, GENERALIZED: Primary | ICD-10-CM

## 2024-12-10 PROCEDURE — 99213 OFFICE O/P EST LOW 20 MIN: CPT | Performed by: FAMILY MEDICINE

## 2024-12-10 NOTE — PROGRESS NOTES
Assessment & Plan   Problem List Items Addressed This Visit       Abdominal pain, generalized - Primary     Distention and discomfort not improved.  Still happening across the upper epigastrium as well as now some difficulty in the lower pelvic region.  As per previous plan we will move to CT scan for further evaluation         Relevant Orders    CT Abdomen Pelvis w Contrast             See Patient Instructions      Subjective   Dilip is a 44 year old, presenting for the following health issues:  Follow Up (Acute Gastritis; Constipation (12/4/24 Visit))      12/10/2024     1:22 PM   Additional Questions   Roomed by MIRIAN Adler   Accompanied by Self         12/10/2024     1:22 PM   Patient Reported Additional Medications   Patient reports taking the following new medications N/A     Since last visit patient has had some bowel movements specifically with the use of mag citrate and magnesium.  Still however having a fullness feeling and achy across the upper abdomen to include the left and right quadrant.  All previous blood work was normal.  Also now a little bit down in the lower umbilical area.  No bulges.  Pain is relatively constant with colicky nature but never full relief.  Decrease in appetite as well.  No blood in stools or black tarry stools.  No nausea or vomiting.    History of Present Illness       Back Pain:  He presents for follow up of back pain. Patient's back pain is a new problem.    Original cause of back pain: not sure  First noticed back pain: 1-4 weeks ago  Patient feels back pain: constantlyLocation of back pain:  Left middle of back  Description of back pain: burning, sharp and stabbing  Back pain spreads: nowhere    Since patient first noticed back pain, pain is: always present, but gets better and worse  Does back pain interfere with his job:  No  On a scale of 1-10 (10 being the worst), patient describes pain as:  4  What makes back pain worse: certain positions, lying down and twisting  "  Acupuncture: not tried  Acetaminophen: not helpful  Activity or exercise: not helpful  Chiropractor:  Not helpful  Cold: not helpful  Heat: not helpful  Massage: not helpful  Muscle relaxants: not helpful  NSAIDS: not helpful  Opioids: not helpful  Physical Therapy: not helpful  Rest: not helpful  Steroid Injection: not helpful  Stretching: not helpful  Surgery: not tried  TENS unit: not tried  Topical pain relievers: not tried  Other healthcare providers patient is seeing for back pain: None    Reason for visit:  Mid to lower back pain that is migrating into my stomach.  Symptom onset:  1-2 weeks ago  Symptoms include:  Lower to mid back pain right behind the bottom of my ribs.  Symptom intensity:  Moderate  Symptom progression:  Worsening  Had these symptoms before:  No  What makes it worse:  No  What makes it better:  No   He is taking medications regularly.         Objective    /81 (BP Location: Left arm, Patient Position: Sitting, Cuff Size: Adult Large)   Pulse 75   Temp 98  F (36.7  C) (Oral)   Resp 12   Ht 1.778 m (5' 10\")   Wt 77.5 kg (170 lb 14.4 oz)   SpO2 98%   BMI 24.52 kg/m    Body mass index is 24.52 kg/m .  Physical Exam  Vitals and nursing note reviewed.   Constitutional:       General: He is not in acute distress.     Appearance: Normal appearance. He is not ill-appearing.   HENT:      Head: Normocephalic and atraumatic.   Eyes:      Extraocular Movements: Extraocular movements intact.      Conjunctiva/sclera: Conjunctivae normal.   Pulmonary:      Effort: Pulmonary effort is normal.   Neurological:      Mental Status: He is alert and oriented to person, place, and time.   Psychiatric:         Attention and Perception: Attention normal.         Mood and Affect: Mood normal.         Speech: Speech normal.         Thought Content: Thought content normal.              Signed Electronically by: Jignesh Mathew, DO    "

## 2024-12-10 NOTE — ASSESSMENT & PLAN NOTE
Distention and discomfort not improved.  Still happening across the upper epigastrium as well as now some difficulty in the lower pelvic region.  As per previous plan we will move to CT scan for further evaluation

## 2024-12-18 ENCOUNTER — HOSPITAL ENCOUNTER (OUTPATIENT)
Dept: CT IMAGING | Facility: HOSPITAL | Age: 44
Discharge: HOME | End: 2024-12-18
Attending: FAMILY MEDICINE
Payer: COMMERCIAL

## 2024-12-18 DIAGNOSIS — R10.84 ABDOMINAL PAIN, GENERALIZED: ICD-10-CM

## 2024-12-18 PROCEDURE — 250N000011 HC RX IP 250 OP 636: Performed by: FAMILY MEDICINE

## 2024-12-18 PROCEDURE — 74177 CT ABD & PELVIS W/CONTRAST: CPT

## 2024-12-18 RX ORDER — IOPAMIDOL 755 MG/ML
90 INJECTION, SOLUTION INTRAVASCULAR ONCE
Status: COMPLETED | OUTPATIENT
Start: 2024-12-18 | End: 2024-12-18

## 2024-12-18 RX ADMIN — IOPAMIDOL 90 ML: 755 INJECTION, SOLUTION INTRAVENOUS at 15:47

## 2024-12-19 ENCOUNTER — PATIENT OUTREACH (OUTPATIENT)
Dept: CARE COORDINATION | Facility: CLINIC | Age: 44
End: 2024-12-19

## 2024-12-19 ENCOUNTER — OFFICE VISIT (OUTPATIENT)
Dept: OTOLARYNGOLOGY | Facility: CLINIC | Age: 44
End: 2024-12-19
Payer: COMMERCIAL

## 2024-12-19 DIAGNOSIS — J31.0 CHRONIC RHINITIS: Primary | ICD-10-CM

## 2024-12-19 DIAGNOSIS — H92.03 OTALGIA, BILATERAL: ICD-10-CM

## 2024-12-19 DIAGNOSIS — J32.0 CHRONIC MAXILLARY SINUSITIS: ICD-10-CM

## 2024-12-19 DIAGNOSIS — M26.609 TMJ (TEMPOROMANDIBULAR JOINT SYNDROME): ICD-10-CM

## 2024-12-19 DIAGNOSIS — H74.13: ICD-10-CM

## 2024-12-19 DIAGNOSIS — H90.6 MIXED CONDUCTIVE AND SENSORINEURAL HEARING LOSS OF BOTH EARS: ICD-10-CM

## 2024-12-19 NOTE — NURSING NOTE
Sino-Nasal Outcome Test (SNOT - 22)    1. Need to Blow Nose: (Proxy-Rptd) (P) Severe  2. Nasal Blockage: (Proxy-Rptd) (P) Severe  3. Sneezing: (Proxy-Rptd) (P) Moderate  4. Runny Nose: (Proxy-Rptd) (P) Moderate  5. Cough: (Proxy-Rptd) (P) Very mild  6. Post-nasal discharge: (Proxy-Rptd) (P) Severe  7. Thick nasal discharge: (Proxy-Rptd) (P) Severe  8. Ear fullness: (Proxy-Rptd) (P) Severe  9. Dizziness: (Proxy-Rptd) (P) Very mild  10. Ear Pain: (Proxy-Rptd) (P) Mild or slight     12. Decreased Sense of Smell/Taste: (Proxy-Rptd) (P) Very mild  13. Difficulty falling asleep: (Proxy-Rptd) (P) Mild or slight  14. Wake up at night: (Proxy-Rptd) (P) Mild or slight  15. Lack of a good night's sleep: (Proxy-Rptd) (P) Mild or slight  16. Wake up tired: (Proxy-Rptd) (P) Mild or slight  17. Fatigue: (Proxy-Rptd) (P) Very mild  18. Reduced Productivity: (Proxy-Rptd) (P) Very mild  19. Reduced Concentration: (Proxy-Rptd) (P) Very mild  20. Frustrated/restless/irritable: (Proxy-Rptd) (P) Very mild  21. Sad: (Proxy-Rptd) (P) None  22. Embarrassed: (Proxy-Rptd) (P) None         COPYRIGHT 1996. [facility] [place] . [facility],[place]

## 2024-12-19 NOTE — LETTER
12/19/2024      Vinny Hernandez  [id number] [address] [address]      Dear Colleague,    Thank you for referring your patient, Vinny Hernandez, to the [facility]. Please see a copy of my visit note below.    Assessment & Plan    Patient medically cleared for hearing aids.    Persistent rhinitis, possibly persistent sinusitis.  Will trial gent/budesonide rinses for a month.  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 chronic pansinusitis, TMJ, adhesion of drum head to incus bilateral. possible ear tube placement SNOT = 47     Last Visit 10/10:  Assessment & Plan  Hearing loss mostly SN and likely sound exposure.      Start ipratropium and budesonide rinses.     Trial home TMJ exercises as per AVS to see if this helps with episodic ear pain.       End of December f/up     Interim Hx:    Sino-Nasal Outcome Test (SNOT - 22)    1. Need to Blow Nose: (Proxy-Rptd) (P) Severe  2. Nasal Blockage: (Proxy-Rptd) (P) Severe  3. Sneezing: (Proxy-Rptd) (P) Moderate  4. Runny Nose: (Proxy-Rptd) (P) Moderate  5. Cough: (Proxy-Rptd) (P) Very mild  6. Post-nasal discharge:  (Proxy-Rptd) (P) Severe  7. Thick nasal discharge: (Proxy-Rptd) (P) Severe  8. Ear fullness: (Proxy-Rptd) (P) Severe  9. Dizziness: (Proxy-Rptd) (P) Very mild  10. Ear Pain: (Proxy-Rptd) (P) Mild or slight     12. Decreased Sense of Smell/Taste: (Proxy-Rptd) (P) Very mild  13. Difficulty falling asleep: (Proxy-Rptd) (P) Mild or slight  14. Wake up at night: (Proxy-Rptd) (P) Mild or slight  15. Lack of a good night's sleep: (Proxy-Rptd) (P) Mild or slight  16. Wake up tired: (Proxy-Rptd) (P) Mild or slight  17. Fatigue: (Proxy-Rptd) (P) Very mild  18. Reduced Productivity: (Proxy-Rptd) (P) Very mild  19. Reduced Concentration: (Proxy-Rptd) (P) Very mild  20. Frustrated/restless/irritable: (Proxy-Rptd) (P) Very mild  21. Sad: (Proxy-Rptd) (P) None  22. Embarrassed: (Proxy-Rptd) (P) None         COPYRIGHT 1996. [facility] [place] . [facility],[place]    Today, patient reports little changes.  Used the ipratropium BID and it didn't help much over once a day.   Ipratropium is 70$ a month, he had tried doubling the dose without much benefit.  Had been using budesonide.  Reports hx septoplasty , bilateral ethmoids, maxillary antrostomies and reduces turbs.   Has been tested for allergies and they found nothing.   Prednisone didn't help the rhinitis but did help  his body aches.  Was placed on amox in September for 7 days which also didn't help  .    Ears still feel plugged.  Is willing to consider hearing aids.      Review of Systems   ENT as above      Objective    There were no vitals taken for this visit.    Physical Exam     Gen- appears well         Again, thank you for allowing me to participate in the care of your patient.        Sincerely,        SOURAV Tanner

## 2024-12-20 ENCOUNTER — MYC MEDICAL ADVICE (OUTPATIENT)
Dept: FAMILY MEDICINE | Facility: CLINIC | Age: 44
End: 2024-12-20

## 2024-12-20 DIAGNOSIS — R10.84 ABDOMINAL PAIN, GENERALIZED: Primary | ICD-10-CM

## 2024-12-24 NOTE — TELEPHONE ENCOUNTER
Problem List Items Addressed This Visit       Abdominal pain, generalized - Primary     Not improving with conservative measures. Will send to gen surgery for further evaluation.         Relevant Orders    Adult Gen Surg  Referral

## 2025-01-02 ENCOUNTER — PATIENT OUTREACH (OUTPATIENT)
Dept: CARE COORDINATION | Facility: CLINIC | Age: 45
End: 2025-01-02
Payer: COMMERCIAL

## 2025-02-03 ENCOUNTER — HOSPITAL ENCOUNTER (OUTPATIENT)
Dept: CT IMAGING | Facility: HOSPITAL | Age: 45
Discharge: HOME | End: 2025-02-03
Attending: PHYSICIAN ASSISTANT | Admitting: PHYSICIAN ASSISTANT
Payer: COMMERCIAL

## 2025-02-03 DIAGNOSIS — J32.4 CHRONIC PANSINUSITIS: ICD-10-CM

## 2025-02-03 DIAGNOSIS — J31.0 CHRONIC RHINITIS: ICD-10-CM

## 2025-02-03 PROCEDURE — 70486 CT MAXILLOFACIAL W/O DYE: CPT

## 2025-02-04 ENCOUNTER — TELEPHONE (OUTPATIENT)
Dept: OTOLARYNGOLOGY | Facility: CLINIC | Age: 45
End: 2025-02-04
Payer: COMMERCIAL

## 2025-02-06 ENCOUNTER — TELEPHONE (OUTPATIENT)
Dept: OTOLARYNGOLOGY | Facility: CLINIC | Age: 45
End: 2025-02-06
Payer: COMMERCIAL

## 2025-02-06 DIAGNOSIS — J31.0 CHRONIC RHINITIS: Primary | ICD-10-CM

## 2025-02-06 DIAGNOSIS — J32.4 CHRONIC PANSINUSITIS: ICD-10-CM

## 2025-02-06 NOTE — TELEPHONE ENCOUNTER
Please contact patient as per unread [employer] message/result note:    Hello.  The Ct shows your sinuses are pretty stable.       Has the new spray helped at all with the runny nose?     Shalom Hudson PA-C

## 2025-02-07 NOTE — CONFIDENTIAL NOTE
"Writer attempted to call pt at number listed to relay results of CT scan and provider comments. Per provider:     \"Hello. The Ct shows your sinuses are pretty stable. Has the new spray helped at all with the runny nose?\"  - Shalom Hudson PA-C    No answer from pt- writer left detailed message with callback number.     Chelsea Kulkarni RN on 2/7/2025 at 10:51 AM    "

## 2025-02-10 NOTE — CONFIDENTIAL NOTE
Writer called and spoke with pt regarding CT sinus results and provider comments. Pt states the nasal spray has not been helping at all with his rhinorrhea symptoms. Pt does report that since using the budesonide solution with the nasal irrigation that the color has changed from yellow to clear, but still has a runny nose constantly. Writer routed message to provider for review and recommendations.     Chelsea Kulkarni RN on 2/10/2025 at 9:00 AM

## 2025-02-10 NOTE — TELEPHONE ENCOUNTER
Please contact patient and let home know I have referred him to rhinology at the  given his hx prior sinus surgery, negative allergy workup  and persistent rhinitis despite astelin, augmentin, oral steroids, ipratropium, budesonise rinses, gent/dex rinses, and now cromolyn.    Shalom Hudson PA-C

## 2025-02-13 NOTE — CONFIDENTIAL NOTE
Writer called and spoke with pt regarding message from Shalom Hudson PA-C that he has sent a [facility]/unresponsive to treatments trailed by Shalom Hudson PA-C. Informed pt he should be receiving a call to schedule with them. Pt verbalized understanding and agrees with plan.     Chelsea Kuklarni RN on 2/13/2025 at 11:56 AM

## 2025-03-16 ENCOUNTER — HEALTH MAINTENANCE LETTER (OUTPATIENT)
Age: 45
End: 2025-03-16